# Patient Record
Sex: FEMALE | Race: BLACK OR AFRICAN AMERICAN | NOT HISPANIC OR LATINO | Employment: FULL TIME | URBAN - METROPOLITAN AREA
[De-identification: names, ages, dates, MRNs, and addresses within clinical notes are randomized per-mention and may not be internally consistent; named-entity substitution may affect disease eponyms.]

---

## 2022-02-28 ENCOUNTER — HISTORICAL (OUTPATIENT)
Dept: LAB | Facility: HOSPITAL | Age: 32
End: 2022-02-28

## 2022-02-28 LAB
ABS NEUT (OLG): 3.1 (ref 1.5–6.9)
ALBUMIN SERPL-MCNC: 3.6 G/DL (ref 3.5–5)
ALBUMIN/GLOB SERPL: 1 {RATIO} (ref 1.1–2)
ALP SERPL-CCNC: 102 U/L (ref 40–150)
ALT SERPL-CCNC: 11 U/L (ref 0–55)
AST SERPL-CCNC: 14 U/L (ref 5–34)
BASOPHILS # BLD AUTO: 0.1 10*3/UL (ref 0–0.1)
BASOPHILS NFR BLD AUTO: 1 % (ref 0–1)
BILIRUB SERPL-MCNC: 0.5 MG/DL
BILIRUBIN DIRECT+TOT PNL SERPL-MCNC: 0.2 (ref 0–0.5)
BILIRUBIN DIRECT+TOT PNL SERPL-MCNC: 0.3 (ref 0–0.8)
BUN SERPL-MCNC: 13 MG/DL (ref 7–18.7)
CALCIUM SERPL-MCNC: 9.1 MG/DL (ref 8.7–10.5)
CHLORIDE SERPL-SCNC: 107 MMOL/L (ref 98–107)
CHOLEST SERPL-MCNC: 177 MG/DL
CHOLEST/HDLC SERPL: 4 {RATIO} (ref 0–5)
CO2 SERPL-SCNC: 28 MMOL/L (ref 22–29)
CREAT SERPL-MCNC: 0.75 MG/DL (ref 0.55–1.02)
DEPRECATED CALCIDIOL+CALCIFEROL SERPL-MC: 13.7 NG/ML (ref 30–80)
EOSINOPHIL # BLD AUTO: 0.2 10*3/UL (ref 0–0.6)
EOSINOPHIL NFR BLD AUTO: 3 % (ref 0–5)
ERYTHROCYTE [DISTWIDTH] IN BLOOD BY AUTOMATED COUNT: 14.7 % (ref 11.5–17)
ESTRADIOL SERPL HS-MCNC: 40 PG/ML
FSH SERPL-ACNC: 8.5 M[IU]/ML
GLOBULIN SER-MCNC: 3.7 G/DL (ref 2.4–3.5)
GLUCOSE SERPL-MCNC: 110 MG/DL (ref 74–100)
HCT VFR BLD AUTO: 37.8 % (ref 36–48)
HDLC SERPL-MCNC: 42 MG/DL (ref 35–60)
HEMOLYSIS INTERF INDEX SERPL-ACNC: 1
HGB BLD-MCNC: 11.7 G/DL (ref 12–16)
ICTERIC INTERF INDEX SERPL-ACNC: 0
IMM GRANULOCYTES # BLD AUTO: 0.01 10*3/UL (ref 0–0.02)
IMM GRANULOCYTES NFR BLD AUTO: 0.2 % (ref 0–0.43)
LDLC SERPL CALC-MCNC: 112 MG/DL (ref 50–140)
LH SERPL-ACNC: 3.97 M[IU]/ML
LIPEMIC INTERF INDEX SERPL-ACNC: 0
LYMPHOCYTES # BLD AUTO: 2 10*3/UL (ref 0.5–4.1)
LYMPHOCYTES NFR BLD AUTO: 34 % (ref 15–40)
MANUAL DIFF? (OHS): NO
MCH RBC QN AUTO: 27 PG (ref 27–34)
MCHC RBC AUTO-ENTMCNC: 31 G/DL (ref 31–36)
MCV RBC AUTO: 86 FL (ref 80–99)
MONOCYTES # BLD AUTO: 0.4 10*3/UL (ref 0–1.1)
MONOCYTES NFR BLD AUTO: 8 % (ref 4–12)
NEUTROPHILS # BLD AUTO: 3.1 10*3/UL (ref 1.5–6.9)
NEUTROPHILS NFR BLD AUTO: 54 % (ref 43–75)
PLATELET # BLD AUTO: 297 10*3/UL (ref 140–400)
PMV BLD AUTO: 12.1 FL (ref 6.8–10)
POTASSIUM SERPL-SCNC: 3.8 MMOL/L (ref 3.5–5.1)
PROLACTIN LEVEL (OHS): 7.63 (ref 5.18–26.53)
PROT SERPL-MCNC: 7.3 G/DL (ref 6.4–8.3)
RBC # BLD AUTO: 4.37 10*6/UL (ref 4.2–5.4)
SODIUM SERPL-SCNC: 140 MMOL/L (ref 136–145)
TRIGL SERPL-MCNC: 114 MG/DL (ref 37–140)
TSH SERPL-ACNC: 1.59 M[IU]/L (ref 0.35–4.94)
VLDLC SERPL CALC-MCNC: 23 MG/DL
WBC # SPEC AUTO: 5.8 10*3/UL (ref 4.5–11.5)

## 2022-09-12 DIAGNOSIS — D50.8 IRON DEFICIENCY ANEMIA SECONDARY TO INADEQUATE DIETARY IRON INTAKE: Primary | ICD-10-CM

## 2022-09-26 ENCOUNTER — HOSPITAL ENCOUNTER (OUTPATIENT)
Dept: RADIOLOGY | Facility: HOSPITAL | Age: 32
Discharge: HOME | End: 2022-09-26
Attending: FAMILY MEDICINE
Payer: COMMERCIAL

## 2022-09-26 DIAGNOSIS — D50.8 IRON DEFICIENCY ANEMIA SECONDARY TO INADEQUATE DIETARY IRON INTAKE: ICD-10-CM

## 2022-09-26 PROCEDURE — 76856 US EXAM PELVIC COMPLETE: CPT | Mod: TC

## 2022-10-07 DIAGNOSIS — D50.0 ANEMIA DUE TO CHRONIC BLOOD LOSS: Primary | ICD-10-CM

## 2022-10-11 ENCOUNTER — OFFICE VISIT (OUTPATIENT)
Dept: HEMATOLOGY/ONCOLOGY | Facility: CLINIC | Age: 32
End: 2022-10-11
Payer: COMMERCIAL

## 2022-10-11 VITALS
SYSTOLIC BLOOD PRESSURE: 142 MMHG | HEIGHT: 72 IN | BODY MASS INDEX: 39.68 KG/M2 | RESPIRATION RATE: 18 BRPM | TEMPERATURE: 98 F | DIASTOLIC BLOOD PRESSURE: 83 MMHG | OXYGEN SATURATION: 100 % | HEART RATE: 94 BPM | WEIGHT: 293 LBS

## 2022-10-11 DIAGNOSIS — D50.9 MICROCYTIC ANEMIA: Primary | ICD-10-CM

## 2022-10-11 DIAGNOSIS — D50.0 ANEMIA DUE TO CHRONIC BLOOD LOSS: ICD-10-CM

## 2022-10-11 PROCEDURE — 1159F MED LIST DOCD IN RCRD: CPT | Mod: CPTII,S$GLB,, | Performed by: INTERNAL MEDICINE

## 2022-10-11 PROCEDURE — 3077F SYST BP >= 140 MM HG: CPT | Mod: CPTII,S$GLB,, | Performed by: INTERNAL MEDICINE

## 2022-10-11 PROCEDURE — 99204 OFFICE O/P NEW MOD 45 MIN: CPT | Mod: S$GLB,,, | Performed by: INTERNAL MEDICINE

## 2022-10-11 PROCEDURE — 1159F PR MEDICATION LIST DOCUMENTED IN MEDICAL RECORD: ICD-10-PCS | Mod: CPTII,S$GLB,, | Performed by: INTERNAL MEDICINE

## 2022-10-11 PROCEDURE — 99999 PR PBB SHADOW E&M-EST. PATIENT-LVL V: ICD-10-PCS | Mod: PBBFAC,,, | Performed by: INTERNAL MEDICINE

## 2022-10-11 PROCEDURE — 3079F PR MOST RECENT DIASTOLIC BLOOD PRESSURE 80-89 MM HG: ICD-10-PCS | Mod: CPTII,S$GLB,, | Performed by: INTERNAL MEDICINE

## 2022-10-11 PROCEDURE — 3008F BODY MASS INDEX DOCD: CPT | Mod: CPTII,S$GLB,, | Performed by: INTERNAL MEDICINE

## 2022-10-11 PROCEDURE — 3077F PR MOST RECENT SYSTOLIC BLOOD PRESSURE >= 140 MM HG: ICD-10-PCS | Mod: CPTII,S$GLB,, | Performed by: INTERNAL MEDICINE

## 2022-10-11 PROCEDURE — 1160F RVW MEDS BY RX/DR IN RCRD: CPT | Mod: CPTII,S$GLB,, | Performed by: INTERNAL MEDICINE

## 2022-10-11 PROCEDURE — 3079F DIAST BP 80-89 MM HG: CPT | Mod: CPTII,S$GLB,, | Performed by: INTERNAL MEDICINE

## 2022-10-11 PROCEDURE — 99999 PR PBB SHADOW E&M-EST. PATIENT-LVL V: CPT | Mod: PBBFAC,,, | Performed by: INTERNAL MEDICINE

## 2022-10-11 PROCEDURE — 99204 PR OFFICE/OUTPT VISIT, NEW, LEVL IV, 45-59 MIN: ICD-10-PCS | Mod: S$GLB,,, | Performed by: INTERNAL MEDICINE

## 2022-10-11 PROCEDURE — 3008F PR BODY MASS INDEX (BMI) DOCUMENTED: ICD-10-PCS | Mod: CPTII,S$GLB,, | Performed by: INTERNAL MEDICINE

## 2022-10-11 PROCEDURE — 1160F PR REVIEW ALL MEDS BY PRESCRIBER/CLIN PHARMACIST DOCUMENTED: ICD-10-PCS | Mod: CPTII,S$GLB,, | Performed by: INTERNAL MEDICINE

## 2022-10-11 RX ORDER — FERROUS SULFATE 325(65) MG
1 TABLET, DELAYED RELEASE (ENTERIC COATED) ORAL 3 TIMES DAILY
COMMUNITY
Start: 2022-09-09 | End: 2022-11-01

## 2022-10-11 RX ORDER — DICYCLOMINE HYDROCHLORIDE 20 MG/1
1 TABLET ORAL 2 TIMES DAILY
Status: ON HOLD | COMMUNITY
Start: 2022-09-28 | End: 2023-01-24

## 2022-10-11 NOTE — PROGRESS NOTES
[facility] Hematology Consult Note    Referring provider: Koko Montana MD    Subjective:       Patient ID: Orin Carcamo is a 32 y.o. female.    .refe  Chief Complaint: Anemia (NHP- Anemia)    Patient is a 32 y.o. year old female with medical history as below. Patient presents for anemia today.   She has had 2 kids in past. Cycles became heavier since last child.   Dr. Sutherland referred her, now on depo shot since 9/9 since she was passing huge clots.   Today she says she's back on cycle again for almost a week, will follow up with gynecologist for possibly another depo shot.   Now eating 2 cups of ice a day.     Labs from September shows Hgb 6.9 MCV 70. She has never had a transfusion or iron infusion. Denies family history of anemia.     Past Medical History:   Diagnosis Date    Anemia, unspecified     HTN (hypertension)       Review of patient's allergies indicates:  No Known Allergies   Current Outpatient Medications on File Prior to Visit   Medication Sig Dispense Refill    dicyclomine (BENTYL) 20 mg tablet Take 1 tablet by mouth 2 (two) times daily.      ferrous sulfate 325 (65 FE) MG EC tablet Take 1 tablet by mouth 3 (three) times daily.       No current facility-administered medications on file prior to visit.      Social History     Socioeconomic History    Marital status: Single   Tobacco Use    Smoking status: Never    Smokeless tobacco: Never   Substance and Sexual Activity    Alcohol use: Yes    Drug use: Never    Sexual activity: Never     Family History   Problem Relation Age of Onset    Diabetes Mother                 Review of Systems  All 12 review of systems negative except as mentioned in HPI.     Physical Exam  Gen: Well appearing, appears stated age, in no acute distress  CV: RRR, no murmurs / thrills auscultated  Pulm: CTA bilaterally, normal respiratory effort  GI: Nondistended, nontender to palpation  Skin: No rashes noted, warm to touch  Extremities: No edema noted.   MSK: Normal ROM.    Psych: Normal affect, normal speech and thought content  Neuro: AAO x 4, no focal deficits.       No visits with results within 2 Week(s) from this visit.   Latest known visit with results is:   Historical on 02/28/2022   Component Date Value    WBC 02/28/2022 5.8     RBC 02/28/2022 4.37     Hgb 02/28/2022 11.7     Hct 02/28/2022 37.8     MCV 02/28/2022 86     MCH 02/28/2022 27     MCHC 02/28/2022 31     RDW 02/28/2022 14.7     Platelet 02/28/2022 297     MPV 02/28/2022 12.1     Abs Neut 02/28/2022 3.1     Manual Diff? 02/28/2022 No     Neut % 02/28/2022 54     Lymph % 02/28/2022 34     Mono % 02/28/2022 8     Eos % 02/28/2022 3     Basophil % 02/28/2022 1     Lymph # 02/28/2022 2.0     Neut # 02/28/2022 3.1     Mono # 02/28/2022 0.4     Eos # 02/28/2022 0.2     Baso # 02/28/2022 0.1     IG# 02/28/2022 0.0100     IG% 02/28/2022 0.200     Sodium Level 02/28/2022 140     Potassium Level 02/28/2022 3.8     Chloride 02/28/2022 107     Carbon Dioxide 02/28/2022 28     Glucose Level 02/28/2022 110     Blood Urea Nitrogen 02/28/2022 13.0     Creatinine 02/28/2022 0.75     Calcium Level Total 02/28/2022 9.1     Albumin Level 02/28/2022 3.6     Protein Total 02/28/2022 7.3     Globulin 02/28/2022 3.7     Albumin/Globulin Ratio 02/28/2022 1.0     Alkaline Phosphatase 02/28/2022 102     Bilirubin Total 02/28/2022 0.5     Bilirubin Direct 02/28/2022 0.2     Bilirubin Indirect 02/28/2022 0.30     Aspartate Aminotransfera* 02/28/2022 14     Alanine Aminotransferase 02/28/2022 11     Hemolysis 02/28/2022 1     Icterus 02/28/2022 0     Lipemia 02/28/2022 0     Cholesterol Total 02/28/2022 177     HDL Cholesterol 02/28/2022 42     Triglyceride 02/28/2022 114     Very Low Density Lipopro* 02/28/2022 23     Cholesterol/HDL Ratio 02/28/2022 4     LDL Cholesterol 02/28/2022 112.00     Estimated GFR- Am* 02/28/2022 >60     Estimated GFR-Non Blessing* 02/28/2022 >60     Thyroid Stimulating Horm* 02/28/2022 1.5863     Vit D 25 OH  02/28/2022 13.7     Follicle Stimulating Hor* 02/28/2022 8.50     Luteinizing Hormone 02/28/2022 3.97     Prolactin Level 02/28/2022 7.63     Estradiol Level 02/28/2022 40         US Pelvis Complete Non OB  Narrative: EXAMINATION:  ULTRASOUND PELVIS NON OB COMPLETE:    CLINICAL HISTORY:  Other iron deficiency anemias,    COMPARISON:  09/14/2016    FINDINGS:  Transabdominal  scanning was performed. Multiple sonographic images reveal the uterus to be prominent size measuring 4.3 x 5.4 x 6.3 cm..  The right ovary measures 2.8 x 1.5 x 3.0 cm and demonstrates venous flow.  The left ovary measures 3.2 x 2.1 x 2.1 cm and demonstrates venous flow.   The endometrium thickened measuring 2.6 cm.  No free fluid is seen within the cul-de-sac. No abnormal adnexal mass is seen.  Impression: 1. Prominent uterus with thickened endometrium measuring 2.6 cm.  Clinical correlation is indicated    Electronically signed by: Fermin Moncada  Date:    09/26/2022  Time:    14:07          Assessment:       1. Microcytic anemia    2. Anemia due to chronic blood loss         Plan:       - Most likely diagnosis is iron deficiency anemia not responding to PO iron  - Will confirm on labs today with CBC, CMP, iron panel, ferritin  - Review labs in 1 week and schedule iron infusion at the time                        I personally reviewed all pertinent imaging, lab results, explained to the patient the pertinent information in detail including radiographic imaging, abnormal lab results. All questions were answered thoroughly. Total time spent on this visit was >65 minutes.    Jp Vasquez M.D.   Hematology / Oncology

## 2022-10-13 LAB
HUMAN PAPILLOMAVIRUS (HPV): NORMAL
PAP RECOMMENDATION EXT: NORMAL
PAP SMEAR: NORMAL

## 2022-10-18 ENCOUNTER — HISTORICAL (OUTPATIENT)
Dept: ADMINISTRATIVE | Facility: HOSPITAL | Age: 32
End: 2022-10-18

## 2022-10-18 ENCOUNTER — OFFICE VISIT (OUTPATIENT)
Dept: HEMATOLOGY/ONCOLOGY | Facility: CLINIC | Age: 32
End: 2022-10-18
Payer: COMMERCIAL

## 2022-10-18 VITALS
OXYGEN SATURATION: 100 % | WEIGHT: 293 LBS | SYSTOLIC BLOOD PRESSURE: 135 MMHG | TEMPERATURE: 98 F | HEART RATE: 75 BPM | BODY MASS INDEX: 39.68 KG/M2 | DIASTOLIC BLOOD PRESSURE: 90 MMHG | RESPIRATION RATE: 18 BRPM | HEIGHT: 72 IN

## 2022-10-18 DIAGNOSIS — D50.0 IRON DEFICIENCY ANEMIA DUE TO CHRONIC BLOOD LOSS: ICD-10-CM

## 2022-10-18 DIAGNOSIS — D50.0 ANEMIA DUE TO CHRONIC BLOOD LOSS: Primary | ICD-10-CM

## 2022-10-18 PROCEDURE — 99214 OFFICE O/P EST MOD 30 MIN: CPT | Mod: PBBFAC | Performed by: NURSE PRACTITIONER

## 2022-10-18 PROCEDURE — 3075F PR MOST RECENT SYSTOLIC BLOOD PRESS GE 130-139MM HG: ICD-10-PCS | Mod: CPTII,S$GLB,, | Performed by: NURSE PRACTITIONER

## 2022-10-18 PROCEDURE — 1159F MED LIST DOCD IN RCRD: CPT | Mod: CPTII,S$GLB,, | Performed by: NURSE PRACTITIONER

## 2022-10-18 PROCEDURE — 99214 PR OFFICE/OUTPT VISIT, EST, LEVL IV, 30-39 MIN: ICD-10-PCS | Mod: S$GLB,,, | Performed by: NURSE PRACTITIONER

## 2022-10-18 PROCEDURE — 3075F SYST BP GE 130 - 139MM HG: CPT | Mod: CPTII,S$GLB,, | Performed by: NURSE PRACTITIONER

## 2022-10-18 PROCEDURE — 1160F PR REVIEW ALL MEDS BY PRESCRIBER/CLIN PHARMACIST DOCUMENTED: ICD-10-PCS | Mod: CPTII,S$GLB,, | Performed by: NURSE PRACTITIONER

## 2022-10-18 PROCEDURE — 99999 PR PBB SHADOW E&M-EST. PATIENT-LVL IV: CPT | Mod: PBBFAC,,, | Performed by: NURSE PRACTITIONER

## 2022-10-18 PROCEDURE — 99999 PR PBB SHADOW E&M-EST. PATIENT-LVL IV: ICD-10-PCS | Mod: PBBFAC,,, | Performed by: NURSE PRACTITIONER

## 2022-10-18 PROCEDURE — 1160F RVW MEDS BY RX/DR IN RCRD: CPT | Mod: CPTII,S$GLB,, | Performed by: NURSE PRACTITIONER

## 2022-10-18 PROCEDURE — 3080F DIAST BP >= 90 MM HG: CPT | Mod: CPTII,S$GLB,, | Performed by: NURSE PRACTITIONER

## 2022-10-18 PROCEDURE — 3080F PR MOST RECENT DIASTOLIC BLOOD PRESSURE >= 90 MM HG: ICD-10-PCS | Mod: CPTII,S$GLB,, | Performed by: NURSE PRACTITIONER

## 2022-10-18 PROCEDURE — 99214 OFFICE O/P EST MOD 30 MIN: CPT | Mod: S$GLB,,, | Performed by: NURSE PRACTITIONER

## 2022-10-18 PROCEDURE — 1159F PR MEDICATION LIST DOCUMENTED IN MEDICAL RECORD: ICD-10-PCS | Mod: CPTII,S$GLB,, | Performed by: NURSE PRACTITIONER

## 2022-10-18 RX ORDER — SODIUM CHLORIDE 0.9 % (FLUSH) 0.9 %
10 SYRINGE (ML) INJECTION
Status: DISCONTINUED | OUTPATIENT
Start: 2022-10-18 | End: 2023-01-24 | Stop reason: HOSPADM

## 2022-10-18 RX ORDER — HEPARIN 100 UNIT/ML
500 SYRINGE INTRAVENOUS
Status: CANCELLED | OUTPATIENT
Start: 2022-10-25

## 2022-10-18 RX ORDER — DIPHENHYDRAMINE HYDROCHLORIDE 50 MG/ML
25 INJECTION INTRAMUSCULAR; INTRAVENOUS
Status: CANCELLED | OUTPATIENT
Start: 2022-10-18

## 2022-10-18 RX ORDER — SODIUM CHLORIDE 0.9 % (FLUSH) 0.9 %
10 SYRINGE (ML) INJECTION
Status: CANCELLED | OUTPATIENT
Start: 2022-10-25

## 2022-10-18 RX ORDER — ACETAMINOPHEN 325 MG/1
650 TABLET ORAL
Status: CANCELLED | OUTPATIENT
Start: 2022-10-18

## 2022-10-18 RX ORDER — HYDROCODONE BITARTRATE AND ACETAMINOPHEN 500; 5 MG/1; MG/1
TABLET ORAL ONCE
Status: CANCELLED | OUTPATIENT
Start: 2022-10-18 | End: 2022-10-18

## 2022-10-18 NOTE — PROGRESS NOTES
[facility] Hematology Consult Note    Referring provider: No ref. provider found    Subjective:       Patient ID: Orin Carcamo is a 32 y.o. female.    .refe  Chief Complaint: MICROCYTIC ANEMIA (Pt reports stomach pain with taking iron pills. Pt states pain 4/10. Pt reports moderate fatigue.)    Ms. Carcamo is a 32 y.o. year old AA female who was referred to our service by Dr. Sutherland  for anemia.     She has had 2 kids in past and her cycles became heavier since last child. She admits prior to September her menstraul cycle continued for 2 months straight. The bleeding was heavy with large clots and she would use 1 pack of pads per day. She was given a Depo shot 9/9/22 which stopped the bleeding, but she had another cycle the week of 10/10/22 which was light per the patient. She will be seeing a GYN (Dr. Byrd) today for blood work, and US and to discuss treatment options. She has been taking oral iron 1 pill daily which has caused her stomach upset and chills. She admits to shortness of breath, cold intolerance, and severe fatigue. She denies chest pain or dizziness at this time. She eats 2 cups of ice a day.     Labs from September shows Hgb 6.9 MCV 70. She has never had a transfusion or iron infusion. Denies family history of anemia.     Past Medical History:   Diagnosis Date    Anemia, unspecified     HTN (hypertension)     Iron deficiency anemia due to chronic blood loss 10/18/2022      Review of patient's allergies indicates:  No Known Allergies   Current Outpatient Medications on File Prior to Visit   Medication Sig Dispense Refill    dicyclomine (BENTYL) 20 mg tablet Take 1 tablet by mouth 2 (two) times daily.      ferrous sulfate 325 (65 FE) MG EC tablet Take 1 tablet by mouth 3 (three) times daily.       No current facility-administered medications on file prior to visit.      Social History     Socioeconomic History    Marital status: Single   Tobacco Use    Smoking status: Never    Smokeless tobacco: Never    Substance and Sexual Activity    Alcohol use: Yes    Drug use: Never    Sexual activity: Never     Family History   Problem Relation Age of Onset    Diabetes Mother                 Review of Systems  All 12 review of systems negative except as mentioned in HPI.     Physical Exam  Gen: Well appearing, appears stated age, in no acute distress  CV: RRR, no murmurs / thrills auscultated  Pulm: CTA bilaterally, normal respiratory effort  GI: Nondistended, nontender to palpation  Skin: No rashes noted, warm to touch  Extremities: No edema noted.   MSK: Normal ROM.   Psych: Normal affect, normal speech and thought content  Neuro: AAO x 4, no focal deficits.       Lab Visit on 10/11/2022   Component Date Value    Iron Binding Capacity Un* 10/11/2022 242     Iron Level 10/11/2022 262 (H)     Iron Binding Capacity To* 10/11/2022 504 (H)     Iron Saturation 10/11/2022 52 (H)     Ferritin Level 10/11/2022 6.96     Sodium Level 10/11/2022 139     Potassium Level 10/11/2022 4.5     Chloride 10/11/2022 107     Carbon Dioxide 10/11/2022 24     Glucose Level 10/11/2022 93     Blood Urea Nitrogen 10/11/2022 10.0     Creatinine 10/11/2022 0.95     Calcium Level Total 10/11/2022 9.0     Protein Total 10/11/2022 7.2     Albumin Level 10/11/2022 3.6     Globulin 10/11/2022 3.6 (H)     Albumin/Globulin Ratio 10/11/2022 1.0 (L)     Bilirubin Total 10/11/2022 0.5     Alkaline Phosphatase 10/11/2022 78     Alanine Aminotransferase 10/11/2022 11     Aspartate Aminotransfera* 10/11/2022 12     eGFR 10/11/2022 >60     WBC 10/11/2022 8.1     RBC 10/11/2022 3.19 (L)     Hgb 10/11/2022 6.0 (L)     Hct 10/11/2022 22.9 (L)     MCV 10/11/2022 71.8 (L)     MCH 10/11/2022 18.8 (L)     MCHC 10/11/2022 26.2 (L)     RDW 10/11/2022 20.4 (H)     Platelet 10/11/2022 454 (H)     MPV 10/11/2022 11.3 (H)     Neut % 10/11/2022 54.3     Lymph % 10/11/2022 34.4     Mono % 10/11/2022 7.7     Eos % 10/11/2022 3.0     Basophil % 10/11/2022 0.2     Lymph #  10/11/2022 2.79     Neut # 10/11/2022 4.4     Mono # 10/11/2022 0.62     Eos # 10/11/2022 0.24     Baso # 10/11/2022 0.02     IG# 10/11/2022 0.03     IG% 10/11/2022 0.4     RBC Morph 10/11/2022 Abnormal (A)     Anisocyte 10/11/2022 Slight (A)     Hypochrom 10/11/2022 2+ (A)     Microcyte 10/11/2022 Slight (A)     Poik 10/11/2022 Slight (A)     Platelet Est 10/11/2022 Increased (A)         US Pelvis Complete Non OB  Narrative: EXAMINATION:  ULTRASOUND PELVIS NON OB COMPLETE:    CLINICAL HISTORY:  Other iron deficiency anemias,    COMPARISON:  09/14/2016    FINDINGS:  Transabdominal  scanning was performed. Multiple sonographic images reveal the uterus to be prominent size measuring 4.3 x 5.4 x 6.3 cm..  The right ovary measures 2.8 x 1.5 x 3.0 cm and demonstrates venous flow.  The left ovary measures 3.2 x 2.1 x 2.1 cm and demonstrates venous flow.   The endometrium thickened measuring 2.6 cm.  No free fluid is seen within the cul-de-sac. No abnormal adnexal mass is seen.  Impression: 1. Prominent uterus with thickened endometrium measuring 2.6 cm.  Clinical correlation is indicated    Electronically signed by: Fermin Moncada  Date:    09/26/2022  Time:    14:07          Assessment:       1. Iron deficiency anemia due to chronic blood loss     Today her Hgb is noted at 6.0, her Ferritin is 6.96, most likely related to menorrhagia. Due to her shortness of breath and weakness we will plan to administer 2u PRBC this week. Additionally we arrange for her to receive Venofer x 5 doses. We will discontinue PO iron due to her poor tolerance. She will further discuss with GYN regarding her menorrhagia and treatment options.     Plan:       RTC in 2 weeks with NP and CBC   Start Venofer x 5       Therapy Plan Information  Medications  iron sucrose (VENOFER) 200 mg in sodium chloride 0.9% 100 mL IVPB  200 mg, Intravenous, 2 times a week, at least 2 days apart  Flushes  sodium chloride 0.9% 250 mL flush bag  Intravenous, 1 time a  week  sodium chloride 0.9% flush 10 mL  10 mL, Intravenous, 1 time a week  heparin, porcine (PF) 100 unit/mL injection flush 500 Units  500 Units, Intravenous, 1 time a week  alteplase injection 2 mg  2 mg, Intra-Catheter, 1 time a week                   Answers submitted by the patient for this visit:  Review of Systems Questionnaire (Submitted on 10/18/2022)  appetite change : No  unexpected weight change: No  mouth sores: No  visual disturbance: No  cough: No  shortness of breath: No  chest pain: No  abdominal pain: No  diarrhea: No  frequency: No  back pain: No  rash: No  headaches: No  adenopathy: No  nervous/ anxious: No

## 2022-10-20 ENCOUNTER — INFUSION (OUTPATIENT)
Dept: INFUSION THERAPY | Facility: HOSPITAL | Age: 32
End: 2022-10-20
Payer: COMMERCIAL

## 2022-10-20 VITALS
TEMPERATURE: 98 F | HEART RATE: 70 BPM | SYSTOLIC BLOOD PRESSURE: 142 MMHG | RESPIRATION RATE: 18 BRPM | OXYGEN SATURATION: 100 % | DIASTOLIC BLOOD PRESSURE: 82 MMHG

## 2022-10-20 DIAGNOSIS — D50.0 ANEMIA DUE TO CHRONIC BLOOD LOSS: ICD-10-CM

## 2022-10-20 LAB
ABO + RH BLD: NORMAL
ABO + RH BLD: NORMAL
BLD PROD TYP BPU: NORMAL
BLD PROD TYP BPU: NORMAL
BLOOD UNIT EXPIRATION DATE: NORMAL
BLOOD UNIT EXPIRATION DATE: NORMAL
BLOOD UNIT TYPE CODE: 5100
BLOOD UNIT TYPE CODE: 5100
CROSSMATCH INTERPRETATION: NORMAL
CROSSMATCH INTERPRETATION: NORMAL
DISPENSE STATUS: NORMAL
DISPENSE STATUS: NORMAL
GROUP & RH: NORMAL
INDIRECT COOMBS GEL: NORMAL
UNIT NUMBER: NORMAL
UNIT NUMBER: NORMAL

## 2022-10-20 PROCEDURE — 86850 RBC ANTIBODY SCREEN: CPT | Performed by: NURSE PRACTITIONER

## 2022-10-20 PROCEDURE — 25000003 PHARM REV CODE 250: Performed by: NURSE PRACTITIONER

## 2022-10-20 PROCEDURE — 36415 COLL VENOUS BLD VENIPUNCTURE: CPT

## 2022-10-20 PROCEDURE — 86920 COMPATIBILITY TEST SPIN: CPT | Performed by: NURSE PRACTITIONER

## 2022-10-20 PROCEDURE — P9016 RBC LEUKOCYTES REDUCED: HCPCS | Performed by: NURSE PRACTITIONER

## 2022-10-20 PROCEDURE — 63600175 PHARM REV CODE 636 W HCPCS: Performed by: NURSE PRACTITIONER

## 2022-10-20 PROCEDURE — 36430 TRANSFUSION BLD/BLD COMPNT: CPT

## 2022-10-20 RX ORDER — HYDROCODONE BITARTRATE AND ACETAMINOPHEN 500; 5 MG/1; MG/1
TABLET ORAL ONCE
Status: COMPLETED | OUTPATIENT
Start: 2022-10-20 | End: 2022-10-20

## 2022-10-20 RX ORDER — DIPHENHYDRAMINE HYDROCHLORIDE 50 MG/ML
25 INJECTION INTRAMUSCULAR; INTRAVENOUS
Status: COMPLETED | OUTPATIENT
Start: 2022-10-20 | End: 2022-10-20

## 2022-10-20 RX ORDER — ACETAMINOPHEN 325 MG/1
650 TABLET ORAL
Status: COMPLETED | OUTPATIENT
Start: 2022-10-20 | End: 2022-10-20

## 2022-10-20 RX ADMIN — DIPHENHYDRAMINE HYDROCHLORIDE 25 MG: 50 INJECTION, SOLUTION INTRAMUSCULAR; INTRAVENOUS at 09:10

## 2022-10-20 RX ADMIN — SODIUM CHLORIDE: 9 INJECTION, SOLUTION INTRAVENOUS at 09:10

## 2022-10-20 RX ADMIN — ACETAMINOPHEN 650 MG: 325 TABLET ORAL at 08:10

## 2022-10-20 NOTE — PLAN OF CARE
Pt will have no s/s of blood transfusion reaction, prbcs verified by 2 Rns, tylenol, benadryl given as pre med regimen, pt monitored initial 15 minutes after beginning transfusion

## 2022-10-26 ENCOUNTER — INFUSION (OUTPATIENT)
Dept: INFUSION THERAPY | Facility: HOSPITAL | Age: 32
End: 2022-10-26
Attending: NURSE PRACTITIONER
Payer: COMMERCIAL

## 2022-10-26 VITALS
DIASTOLIC BLOOD PRESSURE: 73 MMHG | HEART RATE: 71 BPM | WEIGHT: 293 LBS | SYSTOLIC BLOOD PRESSURE: 124 MMHG | RESPIRATION RATE: 18 BRPM | HEIGHT: 72 IN | OXYGEN SATURATION: 98 % | BODY MASS INDEX: 39.68 KG/M2 | TEMPERATURE: 98 F

## 2022-10-26 DIAGNOSIS — D50.0 IRON DEFICIENCY ANEMIA DUE TO CHRONIC BLOOD LOSS: Primary | ICD-10-CM

## 2022-10-26 PROCEDURE — 96366 THER/PROPH/DIAG IV INF ADDON: CPT

## 2022-10-26 PROCEDURE — 96365 THER/PROPH/DIAG IV INF INIT: CPT

## 2022-10-26 PROCEDURE — 63600175 PHARM REV CODE 636 W HCPCS

## 2022-10-26 PROCEDURE — 63600175 PHARM REV CODE 636 W HCPCS: Performed by: NURSE PRACTITIONER

## 2022-10-26 PROCEDURE — 25000003 PHARM REV CODE 250

## 2022-10-26 RX ORDER — HEPARIN 100 UNIT/ML
500 SYRINGE INTRAVENOUS
Status: CANCELLED | OUTPATIENT
Start: 2022-10-28

## 2022-10-26 RX ORDER — SODIUM CHLORIDE 0.9 % (FLUSH) 0.9 %
10 SYRINGE (ML) INJECTION
Status: CANCELLED | OUTPATIENT
Start: 2022-10-28

## 2022-10-26 RX ADMIN — Medication 200 MG: at 09:10

## 2022-10-28 ENCOUNTER — INFUSION (OUTPATIENT)
Dept: INFUSION THERAPY | Facility: HOSPITAL | Age: 32
End: 2022-10-28
Payer: COMMERCIAL

## 2022-10-28 VITALS
HEART RATE: 77 BPM | TEMPERATURE: 98 F | BODY MASS INDEX: 39.68 KG/M2 | SYSTOLIC BLOOD PRESSURE: 153 MMHG | OXYGEN SATURATION: 98 % | WEIGHT: 293 LBS | RESPIRATION RATE: 18 BRPM | HEIGHT: 72 IN | DIASTOLIC BLOOD PRESSURE: 98 MMHG

## 2022-10-28 DIAGNOSIS — D50.0 IRON DEFICIENCY ANEMIA DUE TO CHRONIC BLOOD LOSS: Primary | ICD-10-CM

## 2022-10-28 PROCEDURE — 25000003 PHARM REV CODE 250: Performed by: NURSE PRACTITIONER

## 2022-10-28 PROCEDURE — 96365 THER/PROPH/DIAG IV INF INIT: CPT

## 2022-10-28 PROCEDURE — 96366 THER/PROPH/DIAG IV INF ADDON: CPT

## 2022-10-28 PROCEDURE — 63600175 PHARM REV CODE 636 W HCPCS: Performed by: NURSE PRACTITIONER

## 2022-10-28 RX ORDER — SODIUM CHLORIDE 0.9 % (FLUSH) 0.9 %
10 SYRINGE (ML) INJECTION
Status: CANCELLED | OUTPATIENT
Start: 2022-11-02

## 2022-10-28 RX ORDER — HEPARIN 100 UNIT/ML
500 SYRINGE INTRAVENOUS
Status: CANCELLED | OUTPATIENT
Start: 2022-11-02

## 2022-10-28 RX ADMIN — IRON SUCROSE 200 MG: 20 INJECTION, SOLUTION INTRAVENOUS at 08:10

## 2022-10-28 NOTE — PLAN OF CARE
Encourage use of stress-management techniques (e.g., coping strategies, psychosocial support, distraction, relaxation, massage).  Facilitate sleep/rest patterns, such as maintaining a bedtime routine; avoid long naps; adjust sleep environment to manage impact of fatigue.  Reassess effectiveness of interventions at regular intervals.

## 2022-10-31 ENCOUNTER — LAB VISIT (OUTPATIENT)
Dept: LAB | Facility: HOSPITAL | Age: 32
End: 2022-10-31
Attending: NURSE PRACTITIONER
Payer: COMMERCIAL

## 2022-10-31 DIAGNOSIS — D50.0 IRON DEFICIENCY ANEMIA DUE TO CHRONIC BLOOD LOSS: ICD-10-CM

## 2022-10-31 LAB
BASOPHILS # BLD AUTO: 0.09 X10(3)/MCL (ref 0–0.2)
BASOPHILS NFR BLD AUTO: 1 %
EOSINOPHIL # BLD AUTO: 0.24 X10(3)/MCL (ref 0–0.9)
EOSINOPHIL NFR BLD AUTO: 2.7 %
ERYTHROCYTE [DISTWIDTH] IN BLOOD BY AUTOMATED COUNT: 23.7 % (ref 11.5–17)
HCT VFR BLD AUTO: 31.6 % (ref 37–47)
HGB BLD-MCNC: 8.6 GM/DL (ref 12–16)
IMM GRANULOCYTES # BLD AUTO: 0.03 X10(3)/MCL (ref 0–0.04)
IMM GRANULOCYTES NFR BLD AUTO: 0.3 %
LYMPHOCYTES # BLD AUTO: 2.77 X10(3)/MCL (ref 0.6–4.6)
LYMPHOCYTES NFR BLD AUTO: 30.7 %
MCH RBC QN AUTO: 20.9 PG (ref 27–31)
MCHC RBC AUTO-ENTMCNC: 27.2 MG/DL (ref 33–36)
MCV RBC AUTO: 76.7 FL (ref 80–94)
MONOCYTES # BLD AUTO: 0.64 X10(3)/MCL (ref 0.1–1.3)
MONOCYTES NFR BLD AUTO: 7.1 %
NEUTROPHILS # BLD AUTO: 5.2 X10(3)/MCL (ref 2.1–9.2)
NEUTROPHILS NFR BLD AUTO: 58.2 %
PLATELET # BLD AUTO: 344 X10(3)/MCL (ref 130–400)
PMV BLD AUTO: 11.4 FL (ref 7.4–10.4)
RBC # BLD AUTO: 4.12 X10(6)/MCL (ref 4.2–5.4)
WBC # SPEC AUTO: 9 X10(3)/MCL (ref 4.5–11.5)

## 2022-10-31 PROCEDURE — 36415 COLL VENOUS BLD VENIPUNCTURE: CPT

## 2022-10-31 PROCEDURE — 85025 COMPLETE CBC W/AUTO DIFF WBC: CPT

## 2022-11-01 ENCOUNTER — OFFICE VISIT (OUTPATIENT)
Dept: HEMATOLOGY/ONCOLOGY | Facility: CLINIC | Age: 32
End: 2022-11-01
Payer: COMMERCIAL

## 2022-11-01 VITALS
OXYGEN SATURATION: 98 % | WEIGHT: 293 LBS | SYSTOLIC BLOOD PRESSURE: 131 MMHG | RESPIRATION RATE: 18 BRPM | HEIGHT: 72 IN | BODY MASS INDEX: 39.68 KG/M2 | TEMPERATURE: 98 F | DIASTOLIC BLOOD PRESSURE: 88 MMHG | HEART RATE: 73 BPM

## 2022-11-01 DIAGNOSIS — D50.0 ANEMIA DUE TO CHRONIC BLOOD LOSS: Primary | ICD-10-CM

## 2022-11-01 PROCEDURE — 99214 PR OFFICE/OUTPT VISIT, EST, LEVL IV, 30-39 MIN: ICD-10-PCS | Mod: S$GLB,,, | Performed by: NURSE PRACTITIONER

## 2022-11-01 PROCEDURE — 1160F PR REVIEW ALL MEDS BY PRESCRIBER/CLIN PHARMACIST DOCUMENTED: ICD-10-PCS | Mod: CPTII,S$GLB,, | Performed by: NURSE PRACTITIONER

## 2022-11-01 PROCEDURE — 99999 PR PBB SHADOW E&M-EST. PATIENT-LVL III: ICD-10-PCS | Mod: PBBFAC,,, | Performed by: NURSE PRACTITIONER

## 2022-11-01 PROCEDURE — 99999 PR PBB SHADOW E&M-EST. PATIENT-LVL III: CPT | Mod: PBBFAC,,, | Performed by: NURSE PRACTITIONER

## 2022-11-01 PROCEDURE — 3075F PR MOST RECENT SYSTOLIC BLOOD PRESS GE 130-139MM HG: ICD-10-PCS | Mod: CPTII,S$GLB,, | Performed by: NURSE PRACTITIONER

## 2022-11-01 PROCEDURE — 3079F PR MOST RECENT DIASTOLIC BLOOD PRESSURE 80-89 MM HG: ICD-10-PCS | Mod: CPTII,S$GLB,, | Performed by: NURSE PRACTITIONER

## 2022-11-01 PROCEDURE — 3008F BODY MASS INDEX DOCD: CPT | Mod: CPTII,S$GLB,, | Performed by: NURSE PRACTITIONER

## 2022-11-01 PROCEDURE — 3079F DIAST BP 80-89 MM HG: CPT | Mod: CPTII,S$GLB,, | Performed by: NURSE PRACTITIONER

## 2022-11-01 PROCEDURE — 1159F MED LIST DOCD IN RCRD: CPT | Mod: CPTII,S$GLB,, | Performed by: NURSE PRACTITIONER

## 2022-11-01 PROCEDURE — 1160F RVW MEDS BY RX/DR IN RCRD: CPT | Mod: CPTII,S$GLB,, | Performed by: NURSE PRACTITIONER

## 2022-11-01 PROCEDURE — 3008F PR BODY MASS INDEX (BMI) DOCUMENTED: ICD-10-PCS | Mod: CPTII,S$GLB,, | Performed by: NURSE PRACTITIONER

## 2022-11-01 PROCEDURE — 3075F SYST BP GE 130 - 139MM HG: CPT | Mod: CPTII,S$GLB,, | Performed by: NURSE PRACTITIONER

## 2022-11-01 PROCEDURE — 99214 OFFICE O/P EST MOD 30 MIN: CPT | Mod: S$GLB,,, | Performed by: NURSE PRACTITIONER

## 2022-11-01 PROCEDURE — 1159F PR MEDICATION LIST DOCUMENTED IN MEDICAL RECORD: ICD-10-PCS | Mod: CPTII,S$GLB,, | Performed by: NURSE PRACTITIONER

## 2022-11-01 NOTE — PROGRESS NOTES
[facility] Hematology Consult Note    Referring provider: No ref. provider found    Subjective:       Patient ID: Orin Carcamo is a 32 y.o. female.    .refe  Chief Complaint: MICROCYTIC ANEMIA (Pt reports stomach pain and fatigue is getting better.)    Ms. Carcamo is a 32 y.o. year old AA female who was referred to our service by Dr. Sutherland  for anemia.     She has 2 kids and her cycles became heavier since the last child. She admits prior to September her menstrual cycle continued for 2 months straight. The bleeding was heavy with large clots and she would use 1 pack of pads per day. She was given a Depo shot 9/9/22 which stopped the bleeding, but she had another cycle the week of 10/10/22 which was light per the patient. She had been taking oral iron 1 pill daily which caused her stomach upset and chills. She complained of shortness of breath, cold intolerance, and severe fatigue. She ate 2 cups of ice a day.         Interval History: 11/1/22 Since last visit, she was transfused with 2 units of blood at our direction for a Hgb of 6, and symptomatic, she was also started on venofer and will receive the 3rd dose tomorrow.  She has seen GYN and was started on antibiotics for an infection, the patient is unsure of the details or name of the antibiotics. She also underwent US and was found with ovarian cysts. She reports upon completion of her antibiotics she will have Mirena IUD placed.  Her shortness of breath has improved and she is able to  the shower. She does remain fatigued at this time. She has not had any vaginal bleeding since the week of 10/10/22.     Past Medical History:   Diagnosis Date    Anemia, unspecified     HTN (hypertension)     Iron deficiency anemia due to chronic blood loss 10/18/2022      Review of patient's allergies indicates:  No Known Allergies   Current Outpatient Medications on File Prior to Visit   Medication Sig Dispense Refill    dicyclomine (BENTYL) 20 mg tablet Take 1 tablet by  mouth 2 (two) times daily.      [DISCONTINUED] ferrous sulfate 325 (65 FE) MG EC tablet Take 1 tablet by mouth 3 (three) times daily.       Current Facility-Administered Medications on File Prior to Visit   Medication Dose Route Frequency Provider Last Rate Last Admin    sodium chloride 0.9% flush 10 mL  10 mL Intravenous PRN Thalia Andrew NP          Social History     Socioeconomic History    Marital status: Single   Tobacco Use    Smoking status: Never    Smokeless tobacco: Never   Substance and Sexual Activity    Alcohol use: Yes    Drug use: Never    Sexual activity: Never     Family History   Problem Relation Age of Onset    Diabetes Mother                 Review of Systems  All 12 review of systems negative except as mentioned in HPI.     Physical Exam  Gen: Well appearing, appears stated age, in no acute distress  CV: RRR, no murmurs / thrills auscultated  Pulm: CTA bilaterally, normal respiratory effort  GI: Nondistended, nontender to palpation  Skin: No rashes noted, warm to touch  Extremities: No edema noted.   MSK: Normal ROM.   Psych: Normal affect, normal speech and thought content  Neuro: AAO x 4, no focal deficits.       Lab Visit on 10/31/2022   Component Date Value    WBC 10/31/2022 9.0     RBC 10/31/2022 4.12 (L)     Hgb 10/31/2022 8.6 (L)     Hct 10/31/2022 31.6 (L)     MCV 10/31/2022 76.7 (L)     MCH 10/31/2022 20.9 (L)     MCHC 10/31/2022 27.2 (L)     RDW 10/31/2022 23.7 (H)     Platelet 10/31/2022 344     MPV 10/31/2022 11.4 (H)     Neut % 10/31/2022 58.2     Lymph % 10/31/2022 30.7     Mono % 10/31/2022 7.1     Eos % 10/31/2022 2.7     Basophil % 10/31/2022 1.0     Lymph # 10/31/2022 2.77     Neut # 10/31/2022 5.2     Mono # 10/31/2022 0.64     Eos # 10/31/2022 0.24     Baso # 10/31/2022 0.09     IG# 10/31/2022 0.03     IG% 10/31/2022 0.3    Infusion on 10/20/2022   Component Date Value    Group & Rh 10/20/2022 O POS     Indirect Leslye GEL 10/20/2022 NEG     UNIT NUMBER 10/20/2022  [id number]     UNIT ABO/RH 10/20/2022 O POS     DISPENSE STATUS 10/20/2022 Transfused     Unit Expiration 10/20/2022 [id number]     Product Code 10/20/2022 [id number]     Unit Blood Type Code 10/20/2022 5100     CROSSMATCH INTERPRETATION 10/20/2022 Compatible     UNIT NUMBER 10/20/2022 [id number]     UNIT ABO/RH 10/20/2022 O POS     DISPENSE STATUS 10/20/2022 Transfused     Unit Expiration 10/20/2022 [id number]     Product Code 10/20/2022 [id number]     Unit Blood Type Code 10/20/2022 5100     CROSSMATCH INTERPRETATION 10/20/2022 Compatible         US Pelvis Complete Non OB  Narrative: EXAMINATION:  ULTRASOUND PELVIS NON OB COMPLETE:    CLINICAL HISTORY:  Other iron deficiency anemias,    COMPARISON:  09/14/2016    FINDINGS:  Transabdominal  scanning was performed. Multiple sonographic images reveal the uterus to be prominent size measuring 4.3 x 5.4 x 6.3 cm..  The right ovary measures 2.8 x 1.5 x 3.0 cm and demonstrates venous flow.  The left ovary measures 3.2 x 2.1 x 2.1 cm and demonstrates venous flow.   The endometrium thickened measuring 2.6 cm.  No free fluid is seen within the cul-de-sac. No abnormal adnexal mass is seen.  Impression: 1. Prominent uterus with thickened endometrium measuring 2.6 cm.  Clinical correlation is indicated    Electronically signed by: Fermin Moncada  Date:    09/26/2022  Time:    14:07          Assessment:       1. Anemia due to chronic blood loss     Today her Hgb has improved from 6.0-->8.6 s/p 2u PRBC Given October 2022. Given her Ferritin of  6.96, most likely related to menorrhagia we have started her on Venofer x 5 doses. She will receive the 3rd dose tomorrow.     We have discontinued PO iron due to her poor tolerance. She will further discuss with GYN regarding her menorrhagia and treatment options. She plans to undergo Mirena placement as well upon clearance of her Gyn infection.       Plan:       RTC in 7 weeks with MD and CBC, iron panel  Complete Venofer x 5      Educated patient on when to call for further evaluation or to go to ER.       Therapy Plan Information  Medications  iron sucrose (VENOFER) 200 mg in sodium chloride 0.9% 100 mL IVPB  200 mg, Intravenous, 2 times a week, at least 2 days apart  Flushes  sodium chloride 0.9% 250 mL flush bag  Intravenous, 1 time a week  sodium chloride 0.9% flush 10 mL  10 mL, Intravenous, 1 time a week  heparin, porcine (PF) 100 unit/mL injection flush 500 Units  500 Units, Intravenous, 1 time a week  alteplase injection 2 mg  2 mg, Intra-Catheter, 1 time a week                 Answers submitted by the patient for this visit:  Review of Systems Questionnaire (Submitted on 10/31/2022)  appetite change : No  unexpected weight change: No  mouth sores: No  visual disturbance: No  cough: No  shortness of breath: No  chest pain: No  abdominal pain: No  diarrhea: No  frequency: No  back pain: Yes  rash: No  headaches: No  adenopathy: No  nervous/ anxious: No

## 2022-11-02 ENCOUNTER — INFUSION (OUTPATIENT)
Dept: INFUSION THERAPY | Facility: HOSPITAL | Age: 32
End: 2022-11-02
Payer: COMMERCIAL

## 2022-11-02 VITALS
BODY MASS INDEX: 39.68 KG/M2 | HEART RATE: 81 BPM | HEIGHT: 72 IN | DIASTOLIC BLOOD PRESSURE: 94 MMHG | SYSTOLIC BLOOD PRESSURE: 146 MMHG | WEIGHT: 293 LBS | RESPIRATION RATE: 18 BRPM | OXYGEN SATURATION: 98 % | TEMPERATURE: 98 F

## 2022-11-02 DIAGNOSIS — D50.0 IRON DEFICIENCY ANEMIA DUE TO CHRONIC BLOOD LOSS: Primary | ICD-10-CM

## 2022-11-02 PROCEDURE — 25000003 PHARM REV CODE 250: Performed by: NURSE PRACTITIONER

## 2022-11-02 PROCEDURE — 96365 THER/PROPH/DIAG IV INF INIT: CPT

## 2022-11-02 PROCEDURE — 63600175 PHARM REV CODE 636 W HCPCS: Performed by: NURSE PRACTITIONER

## 2022-11-02 RX ORDER — SODIUM CHLORIDE 0.9 % (FLUSH) 0.9 %
10 SYRINGE (ML) INJECTION
Status: CANCELLED | OUTPATIENT
Start: 2022-11-04

## 2022-11-02 RX ORDER — HEPARIN 100 UNIT/ML
500 SYRINGE INTRAVENOUS
Status: CANCELLED | OUTPATIENT
Start: 2022-11-04

## 2022-11-02 RX ORDER — HEPARIN 100 UNIT/ML
500 SYRINGE INTRAVENOUS
Status: DISCONTINUED | OUTPATIENT
Start: 2022-11-02 | End: 2023-01-24

## 2022-11-02 RX ORDER — SODIUM CHLORIDE 0.9 % (FLUSH) 0.9 %
10 SYRINGE (ML) INJECTION
Status: DISCONTINUED | OUTPATIENT
Start: 2022-11-02 | End: 2023-01-24

## 2022-11-02 RX ADMIN — IRON SUCROSE 200 MG: 20 INJECTION, SOLUTION INTRAVENOUS at 08:11

## 2022-11-04 ENCOUNTER — INFUSION (OUTPATIENT)
Dept: INFUSION THERAPY | Facility: HOSPITAL | Age: 32
End: 2022-11-04
Payer: COMMERCIAL

## 2022-11-04 VITALS
OXYGEN SATURATION: 98 % | TEMPERATURE: 98 F | WEIGHT: 293 LBS | BODY MASS INDEX: 39.68 KG/M2 | RESPIRATION RATE: 18 BRPM | DIASTOLIC BLOOD PRESSURE: 84 MMHG | SYSTOLIC BLOOD PRESSURE: 126 MMHG | HEART RATE: 74 BPM | HEIGHT: 72 IN

## 2022-11-04 DIAGNOSIS — D50.0 IRON DEFICIENCY ANEMIA DUE TO CHRONIC BLOOD LOSS: Primary | ICD-10-CM

## 2022-11-04 PROCEDURE — 63600175 PHARM REV CODE 636 W HCPCS: Performed by: NURSE PRACTITIONER

## 2022-11-04 PROCEDURE — 96365 THER/PROPH/DIAG IV INF INIT: CPT

## 2022-11-04 PROCEDURE — 25000003 PHARM REV CODE 250: Performed by: NURSE PRACTITIONER

## 2022-11-04 RX ORDER — SODIUM CHLORIDE 0.9 % (FLUSH) 0.9 %
10 SYRINGE (ML) INJECTION
Status: CANCELLED | OUTPATIENT
Start: 2022-11-09

## 2022-11-04 RX ORDER — HEPARIN 100 UNIT/ML
500 SYRINGE INTRAVENOUS
Status: CANCELLED | OUTPATIENT
Start: 2022-11-09

## 2022-11-04 RX ADMIN — IRON SUCROSE 200 MG: 20 INJECTION, SOLUTION INTRAVENOUS at 09:11

## 2022-11-09 ENCOUNTER — INFUSION (OUTPATIENT)
Dept: INFUSION THERAPY | Facility: HOSPITAL | Age: 32
End: 2022-11-09
Payer: COMMERCIAL

## 2022-11-09 VITALS
RESPIRATION RATE: 18 BRPM | BODY MASS INDEX: 39.68 KG/M2 | WEIGHT: 293 LBS | HEIGHT: 72 IN | SYSTOLIC BLOOD PRESSURE: 162 MMHG | TEMPERATURE: 98 F | DIASTOLIC BLOOD PRESSURE: 99 MMHG | HEART RATE: 79 BPM

## 2022-11-09 DIAGNOSIS — D50.0 IRON DEFICIENCY ANEMIA DUE TO CHRONIC BLOOD LOSS: Primary | ICD-10-CM

## 2022-11-09 PROCEDURE — 63600175 PHARM REV CODE 636 W HCPCS: Performed by: NURSE PRACTITIONER

## 2022-11-09 PROCEDURE — 25000003 PHARM REV CODE 250: Performed by: NURSE PRACTITIONER

## 2022-11-09 PROCEDURE — 96365 THER/PROPH/DIAG IV INF INIT: CPT

## 2022-11-09 RX ORDER — SODIUM CHLORIDE 0.9 % (FLUSH) 0.9 %
10 SYRINGE (ML) INJECTION
Status: CANCELLED | OUTPATIENT
Start: 2022-11-16

## 2022-11-09 RX ORDER — HEPARIN 100 UNIT/ML
500 SYRINGE INTRAVENOUS
Status: CANCELLED | OUTPATIENT
Start: 2022-11-16

## 2022-11-09 RX ORDER — HEPARIN 100 UNIT/ML
500 SYRINGE INTRAVENOUS
Status: DISCONTINUED | OUTPATIENT
Start: 2022-11-09 | End: 2023-01-24

## 2022-11-09 RX ORDER — SODIUM CHLORIDE 0.9 % (FLUSH) 0.9 %
10 SYRINGE (ML) INJECTION
Status: DISCONTINUED | OUTPATIENT
Start: 2022-11-09 | End: 2023-01-24

## 2022-11-09 RX ADMIN — IRON SUCROSE 200 MG: 20 INJECTION, SOLUTION INTRAVENOUS at 09:11

## 2022-11-09 RX ADMIN — SODIUM CHLORIDE: 9 INJECTION, SOLUTION INTRAVENOUS at 08:11

## 2022-11-09 NOTE — PLAN OF CARE
Personal items kept at chairside.  Treatment area free of clutter.  Assist with ambulation as needed.

## 2023-01-03 NOTE — PROGRESS NOTES
[facility] Hematology Consult Note    Referring provider: No ref. provider found    Subjective:       Patient ID: Orin Carcamo is a 32 y.o. female.    .refe  Chief Complaint: Anemia (Pt reports no new issues/concerns at this time.)      Ms. Carcamo is a 32 y.o. year old AA female who was referred to our service by Dr. Sutherland  for anemia.     She has 2 kids and her cycles became heavier since the last child. She admits prior to September her menstrual cycle continued for 2 months straight. The bleeding was heavy with large clots and she would use 1 pack of pads per day. She was given a Depo shot 9/9/22 which stopped the bleeding, but she had another cycle the week of 10/10/22 which was light per the patient. She had been taking oral iron 1 pill daily which caused her stomach upset and chills. She complained of shortness of breath, cold intolerance, and severe fatigue. She ate 2 cups of ice a day.         Interval History:     Patient here for follow-up after getting 5 doses of IV Venofer.  She denies any chest pain, palpitation, shortness of breath.  Patient still have some vaginal bleeding but it had improved.  Denies any blood in the urine or bowel movements        11/1/22 Since last visit, she was transfused with 2 units of blood at our direction for a Hgb of 6, and symptomatic, she was also started on venofer and will receive the 3rd dose tomorrow.  She has seen GYN and was started on antibiotics for an infection, the patient is unsure of the details or name of the antibiotics. She also underwent US and was found with ovarian cysts. She reports upon completion of her antibiotics she will have Mirena IUD placed.  Her shortness of breath has improved and she is able to  the shower. She does remain fatigued at this time. She has not had any vaginal bleeding since the week of 10/10/22.     Past Medical History:   Diagnosis Date    Anemia, unspecified     HTN (hypertension)     Iron deficiency anemia due to  chronic blood loss 10/18/2022      Review of patient's allergies indicates:  No Known Allergies   Current Outpatient Medications on File Prior to Visit   Medication Sig Dispense Refill    dicyclomine (BENTYL) 20 mg tablet Take 1 tablet by mouth 2 (two) times daily.      doxycycline (VIBRAMYCIN) 100 MG Cap TAKE 1 CAPSULE BY MOUTH TWICE DAILY FOR 14 DAYS may take WITH food TO minimize abdominal discomfortavoid alcohol       Current Facility-Administered Medications on File Prior to Visit   Medication Dose Route Frequency Provider Last Rate Last Admin    alteplase injection 2 mg  2 mg Intra-Catheter PRN Thalia Andrew, MELISSA        alteplase injection 2 mg  2 mg Intra-Catheter PRN Thalia Andrew, MELISSA        heparin, porcine (PF) 100 unit/mL injection flush 500 Units  500 Units Intravenous PRN Thalia Andrew, MELISSA        heparin, porcine (PF) 100 unit/mL injection flush 500 Units  500 Units Intravenous PRN Thalia Andrew, MELISSA        sodium chloride 0.9% 250 mL flush bag   Intravenous 1 time in Clinic/HOD Thalia Andrew NP        sodium chloride 0.9% flush 10 mL  10 mL Intravenous PRN Thalia Andrew, MELISSA        sodium chloride 0.9% flush 10 mL  10 mL Intravenous PRN Thalia Andrew, MELISSA        sodium chloride 0.9% flush 10 mL  10 mL Intravenous PRN Thalia Andrew, MELISSA          Social History     Socioeconomic History    Marital status: Single   Tobacco Use    Smoking status: Never    Smokeless tobacco: Never   Substance and Sexual Activity    Alcohol use: Yes    Drug use: Never    Sexual activity: Never     Family History   Problem Relation Age of Onset    Diabetes Mother                 Review of Systems  All 12 review of systems negative except as mentioned in HPI.     Physical Exam  Gen: Well appearing, appears stated age, in no acute distress  CV: RRR, no murmurs / thrills auscultated  Pulm: CTA bilaterally, normal respiratory effort  GI: Nondistended, nontender to  palpation  Skin: No rashes noted, warm to touch  Extremities: No edema noted.   MSK: Normal ROM.   Psych: Normal affect, normal speech and thought content  Neuro: AAO x 4, no focal deficits.       Lab Visit on 01/04/2023   Component Date Value    Sodium Level 01/04/2023 140     Potassium Level 01/04/2023 3.9     Chloride 01/04/2023 108 (H)     Carbon Dioxide 01/04/2023 24     Glucose Level 01/04/2023 82     Blood Urea Nitrogen 01/04/2023 13.0     Creatinine 01/04/2023 0.83     Calcium Level Total 01/04/2023 9.0     Protein Total 01/04/2023 7.4     Albumin Level 01/04/2023 3.6     Globulin 01/04/2023 3.8 (H)     Albumin/Globulin Ratio 01/04/2023 0.9 (L)     Bilirubin Total 01/04/2023 0.6     Alkaline Phosphatase 01/04/2023 85     Alanine Aminotransferase 01/04/2023 10     Aspartate Aminotransfera* 01/04/2023 11     eGFR 01/04/2023 >90     Ferritin Level 01/04/2023 24.45     Iron Binding Capacity Un* 01/04/2023 351 (H)     Iron Level 01/04/2023 34 (L)     Iron Binding Capacity To* 01/04/2023 385     Iron Saturation 01/04/2023 9 (L)     WBC 01/04/2023 6.7     RBC 01/04/2023 4.31     Hgb 01/04/2023 11.4 (L)     Hct 01/04/2023 38.0     MCV 01/04/2023 88.2     MCH 01/04/2023 26.5     MCHC 01/04/2023 30.0 (L)     RDW 01/04/2023 20.0 (H)     Platelet 01/04/2023 279     MPV 01/04/2023 11.3     Neut % 01/04/2023 48.1     Lymph % 01/04/2023 41.3     Mono % 01/04/2023 7.6     Eos % 01/04/2023 1.9     Basophil % 01/04/2023 1.0     Lymph # 01/04/2023 2.78     Neut # 01/04/2023 3.23     Mono # 01/04/2023 0.51     Eos # 01/04/2023 0.13     Baso # 01/04/2023 0.07     IG# 01/04/2023 0.01     IG% 01/04/2023 0.1         US Pelvis Complete Non OB  Narrative: EXAMINATION:  ULTRASOUND PELVIS NON OB COMPLETE:    CLINICAL HISTORY:  Other iron deficiency anemias,    COMPARISON:  09/14/2016    FINDINGS:  Transabdominal  scanning was performed. Multiple sonographic images reveal the uterus to be prominent size measuring 4.3 x 5.4 x 6.3 cm..   The right ovary measures 2.8 x 1.5 x 3.0 cm and demonstrates venous flow.  The left ovary measures 3.2 x 2.1 x 2.1 cm and demonstrates venous flow.   The endometrium thickened measuring 2.6 cm.  No free fluid is seen within the cul-de-sac. No abnormal adnexal mass is seen.  Impression: 1. Prominent uterus with thickened endometrium measuring 2.6 cm.  Clinical correlation is indicated    Electronically signed by: Fermin Moncada  Date:    09/26/2022  Time:    14:07            Assessment:       GABRIELA:  - Hgb improved from 6.0-->8.6 s/p 2u PRBC   - Treated with IV Venofer x 5 doses (11/2022)  - discontinued PO iron due to her poor tolerance      Plan:    - LABS reviewed, will treat with IV IRON dextran 1 gm   - RTC in 5 weeks with MD / LABS / TREATMENT VISIT     The patient was seen, interviewed and examined. Pertinent lab and radiology studies were reviewed. Pt instructed to call should develop concerning signs/symptoms or have further questions.       Portions of the record may have been created with voice recognition software. Occasional wrong-word or sound-a-like substitutions may have occurred due to the inherent limitations of voice recognition software. Read the chart carefully and recognize, using context, where substitutions have occurred.    Susan Farley MD  Hematology / Oncology

## 2023-01-04 ENCOUNTER — OFFICE VISIT (OUTPATIENT)
Dept: HEMATOLOGY/ONCOLOGY | Facility: CLINIC | Age: 33
End: 2023-01-04
Payer: COMMERCIAL

## 2023-01-04 VITALS
OXYGEN SATURATION: 99 % | HEIGHT: 72 IN | RESPIRATION RATE: 18 BRPM | WEIGHT: 293 LBS | HEART RATE: 80 BPM | SYSTOLIC BLOOD PRESSURE: 128 MMHG | TEMPERATURE: 98 F | DIASTOLIC BLOOD PRESSURE: 88 MMHG | BODY MASS INDEX: 39.68 KG/M2

## 2023-01-04 DIAGNOSIS — D50.0 IRON DEFICIENCY ANEMIA DUE TO CHRONIC BLOOD LOSS: Primary | ICD-10-CM

## 2023-01-04 PROCEDURE — 99999 PR PBB SHADOW E&M-EST. PATIENT-LVL IV: CPT | Mod: PBBFAC,,, | Performed by: INTERNAL MEDICINE

## 2023-01-04 PROCEDURE — 3074F SYST BP LT 130 MM HG: CPT | Mod: CPTII,S$GLB,, | Performed by: INTERNAL MEDICINE

## 2023-01-04 PROCEDURE — 1159F MED LIST DOCD IN RCRD: CPT | Mod: CPTII,S$GLB,, | Performed by: INTERNAL MEDICINE

## 2023-01-04 PROCEDURE — 3079F PR MOST RECENT DIASTOLIC BLOOD PRESSURE 80-89 MM HG: ICD-10-PCS | Mod: CPTII,S$GLB,, | Performed by: INTERNAL MEDICINE

## 2023-01-04 PROCEDURE — 3008F BODY MASS INDEX DOCD: CPT | Mod: CPTII,S$GLB,, | Performed by: INTERNAL MEDICINE

## 2023-01-04 PROCEDURE — 99214 OFFICE O/P EST MOD 30 MIN: CPT | Mod: S$GLB,,, | Performed by: INTERNAL MEDICINE

## 2023-01-04 PROCEDURE — 99214 PR OFFICE/OUTPT VISIT, EST, LEVL IV, 30-39 MIN: ICD-10-PCS | Mod: S$GLB,,, | Performed by: INTERNAL MEDICINE

## 2023-01-04 PROCEDURE — 3079F DIAST BP 80-89 MM HG: CPT | Mod: CPTII,S$GLB,, | Performed by: INTERNAL MEDICINE

## 2023-01-04 PROCEDURE — 99999 PR PBB SHADOW E&M-EST. PATIENT-LVL IV: ICD-10-PCS | Mod: PBBFAC,,, | Performed by: INTERNAL MEDICINE

## 2023-01-04 PROCEDURE — 3074F PR MOST RECENT SYSTOLIC BLOOD PRESSURE < 130 MM HG: ICD-10-PCS | Mod: CPTII,S$GLB,, | Performed by: INTERNAL MEDICINE

## 2023-01-04 PROCEDURE — 1159F PR MEDICATION LIST DOCUMENTED IN MEDICAL RECORD: ICD-10-PCS | Mod: CPTII,S$GLB,, | Performed by: INTERNAL MEDICINE

## 2023-01-04 PROCEDURE — 3008F PR BODY MASS INDEX (BMI) DOCUMENTED: ICD-10-PCS | Mod: CPTII,S$GLB,, | Performed by: INTERNAL MEDICINE

## 2023-01-04 RX ORDER — SODIUM CHLORIDE 0.9 % (FLUSH) 0.9 %
10 SYRINGE (ML) INJECTION
Status: CANCELLED | OUTPATIENT
Start: 2023-01-11

## 2023-01-04 RX ORDER — DOXYCYCLINE 100 MG/1
CAPSULE ORAL
Status: ON HOLD | COMMUNITY
Start: 2022-12-23 | End: 2023-01-24

## 2023-01-04 RX ORDER — ACETAMINOPHEN 325 MG/1
650 TABLET ORAL
Status: CANCELLED | OUTPATIENT
Start: 2023-01-11

## 2023-01-04 RX ORDER — HEPARIN 100 UNIT/ML
500 SYRINGE INTRAVENOUS
Status: CANCELLED | OUTPATIENT
Start: 2023-01-11

## 2023-01-13 ENCOUNTER — DOCUMENTATION ONLY (OUTPATIENT)
Dept: ADMINISTRATIVE | Facility: HOSPITAL | Age: 33
End: 2023-01-13
Payer: COMMERCIAL

## 2023-01-23 ENCOUNTER — LAB VISIT (OUTPATIENT)
Dept: LAB | Facility: HOSPITAL | Age: 33
End: 2023-01-23
Attending: OBSTETRICS & GYNECOLOGY
Payer: COMMERCIAL

## 2023-01-23 ENCOUNTER — ANESTHESIA EVENT (OUTPATIENT)
Dept: SURGERY | Facility: HOSPITAL | Age: 33
End: 2023-01-23
Payer: COMMERCIAL

## 2023-01-23 DIAGNOSIS — Z22.39: ICD-10-CM

## 2023-01-23 DIAGNOSIS — E66.01 MORBID OBESITY: ICD-10-CM

## 2023-01-23 DIAGNOSIS — N73.9 FEMALE PELVIC INFLAMMATION: ICD-10-CM

## 2023-01-23 DIAGNOSIS — N93.9 VAGINA BLEEDING: Primary | ICD-10-CM

## 2023-01-23 LAB — B-HCG SERPL QL: NEGATIVE

## 2023-01-23 PROCEDURE — 81025 URINE PREGNANCY TEST: CPT

## 2023-01-23 PROCEDURE — 86900 BLOOD TYPING SEROLOGIC ABO: CPT | Performed by: OBSTETRICS & GYNECOLOGY

## 2023-01-23 PROCEDURE — 36415 COLL VENOUS BLD VENIPUNCTURE: CPT

## 2023-01-23 NOTE — ANESTHESIA PREPROCEDURE EVALUATION
01/23/2023  Orin Carcamo is a 32 y.o., female.      Pre-op Assessment    I have reviewed the Patient Summary Reports.     I have reviewed the Nursing Notes. I have reviewed the NPO Status.   I have reviewed the Medications.     Review of Systems  Anesthesia Hx:  No problems with previous Anesthesia  Denies Family Hx of Anesthesia complications.   Denies Personal Hx of Anesthesia complications.   Hematology/Oncology:     Oncology Normal    -- Anemia: Iron Deficiency Anemia   EENT/Dental:EENT/Dental Normal   Cardiovascular:  Cardiovascular Normal Exercise tolerance: good   Hypertension    Pulmonary:  Pulmonary Normal    Renal/:  Renal/ Normal     Hepatic/GI:  Hepatic/GI Normal    Musculoskeletal:  Musculoskeletal Normal    Neurological:  Neurology Normal    Endocrine:  Endocrine Normal  Morbid Obesity / BMI > 40  Dermatological:  Skin Normal    Psych:  Psychiatric Normal           Physical Exam  General: Well nourished, Cooperative, Alert and Oriented    Airway:  Mallampati: II / II  Mouth Opening: Normal  TM Distance: Normal  Tongue: Normal  Neck ROM: Normal ROM    Dental:  Intact        Anesthesia Plan  Type of Anesthesia, risks & benefits discussed:    Anesthesia Type: Gen ETT  Intra-op Monitoring Plan: Standard ASA Monitors  Post Op Pain Control Plan: multimodal analgesia  Induction:  IV  Airway Plan: Direct  Informed Consent: Informed consent signed with the Patient and all parties understand the risks and agree with anesthesia plan.  All questions answered. Patient consented to blood products? Yes  ASA Score: 2  Day of Surgery Review of History & Physical: H&P Update referred to the surgeon/provider.I have interviewed and examined the patient. I have reviewed the patient's H&P dated: There are no significant changes.     Ready For Surgery From Anesthesia Perspective.     .

## 2023-01-24 ENCOUNTER — ANESTHESIA (OUTPATIENT)
Dept: SURGERY | Facility: HOSPITAL | Age: 33
End: 2023-01-24
Payer: COMMERCIAL

## 2023-01-24 ENCOUNTER — HOSPITAL ENCOUNTER (OUTPATIENT)
Facility: HOSPITAL | Age: 33
Discharge: HOME | End: 2023-01-24
Attending: OBSTETRICS & GYNECOLOGY | Admitting: OBSTETRICS & GYNECOLOGY
Payer: COMMERCIAL

## 2023-01-24 VITALS
HEIGHT: 72 IN | HEART RATE: 63 BPM | TEMPERATURE: 97 F | BODY MASS INDEX: 39.68 KG/M2 | OXYGEN SATURATION: 100 % | DIASTOLIC BLOOD PRESSURE: 98 MMHG | SYSTOLIC BLOOD PRESSURE: 164 MMHG | RESPIRATION RATE: 18 BRPM | WEIGHT: 293 LBS

## 2023-01-24 DIAGNOSIS — N93.9 VAGINAL BLEEDING: ICD-10-CM

## 2023-01-24 DIAGNOSIS — D50.0 IRON DEFICIENCY ANEMIA DUE TO CHRONIC BLOOD LOSS: ICD-10-CM

## 2023-01-24 DIAGNOSIS — Z30.430 ENCOUNTER FOR IUD INSERTION: Primary | ICD-10-CM

## 2023-01-24 DIAGNOSIS — N92.1 MENORRHAGIA WITH IRREGULAR CYCLE: ICD-10-CM

## 2023-01-24 PROBLEM — N92.0 MENORRHAGIA: Status: ACTIVE | Noted: 2023-01-24

## 2023-01-24 LAB
ABO AND RH: NORMAL
ABORH RETYPE: NORMAL
ANTIBODY SCREEN: NORMAL
B-HCG SERPL QL: NEGATIVE

## 2023-01-24 PROCEDURE — 58300 INSERT INTRAUTERINE DEVICE: CPT | Mod: ,,, | Performed by: OBSTETRICS & GYNECOLOGY

## 2023-01-24 PROCEDURE — 58300 PR INSERT INTRAUTERINE DEVICE: ICD-10-PCS | Mod: ,,, | Performed by: OBSTETRICS & GYNECOLOGY

## 2023-01-24 PROCEDURE — 81025 URINE PREGNANCY TEST: CPT

## 2023-01-24 PROCEDURE — 71000016 HC POSTOP RECOV ADDL HR: Performed by: OBSTETRICS & GYNECOLOGY

## 2023-01-24 PROCEDURE — 25000003 PHARM REV CODE 250: Performed by: OBSTETRICS & GYNECOLOGY

## 2023-01-24 PROCEDURE — 71000033 HC RECOVERY, INTIAL HOUR: Performed by: OBSTETRICS & GYNECOLOGY

## 2023-01-24 PROCEDURE — 71000015 HC POSTOP RECOV 1ST HR: Performed by: OBSTETRICS & GYNECOLOGY

## 2023-01-24 PROCEDURE — 63600175 PHARM REV CODE 636 W HCPCS: Performed by: OBSTETRICS & GYNECOLOGY

## 2023-01-24 PROCEDURE — D9220A PRA ANESTHESIA: Mod: ,,, | Performed by: NURSE ANESTHETIST, CERTIFIED REGISTERED

## 2023-01-24 PROCEDURE — 37000009 HC ANESTHESIA EA ADD 15 MINS: Performed by: OBSTETRICS & GYNECOLOGY

## 2023-01-24 PROCEDURE — 36000707: Performed by: OBSTETRICS & GYNECOLOGY

## 2023-01-24 PROCEDURE — D9220A PRA ANESTHESIA: ICD-10-PCS | Mod: ,,, | Performed by: NURSE ANESTHETIST, CERTIFIED REGISTERED

## 2023-01-24 PROCEDURE — 36415 COLL VENOUS BLD VENIPUNCTURE: CPT

## 2023-01-24 PROCEDURE — 63600175 PHARM REV CODE 636 W HCPCS: Performed by: NURSE ANESTHETIST, CERTIFIED REGISTERED

## 2023-01-24 PROCEDURE — 58558 PR HYSTEROSCOPY,W/ENDO BX: ICD-10-PCS | Mod: ,,, | Performed by: OBSTETRICS & GYNECOLOGY

## 2023-01-24 PROCEDURE — 58558 HYSTEROSCOPY BIOPSY: CPT | Mod: ,,, | Performed by: OBSTETRICS & GYNECOLOGY

## 2023-01-24 PROCEDURE — 37000008 HC ANESTHESIA 1ST 15 MINUTES: Performed by: OBSTETRICS & GYNECOLOGY

## 2023-01-24 PROCEDURE — 63600175 PHARM REV CODE 636 W HCPCS

## 2023-01-24 PROCEDURE — 36000706: Performed by: OBSTETRICS & GYNECOLOGY

## 2023-01-24 PROCEDURE — 25000003 PHARM REV CODE 250: Performed by: NURSE ANESTHETIST, CERTIFIED REGISTERED

## 2023-01-24 RX ORDER — SODIUM CHLORIDE, SODIUM LACTATE, POTASSIUM CHLORIDE, CALCIUM CHLORIDE 600; 310; 30; 20 MG/100ML; MG/100ML; MG/100ML; MG/100ML
INJECTION, SOLUTION INTRAVENOUS CONTINUOUS
Status: ACTIVE | OUTPATIENT
Start: 2023-01-24

## 2023-01-24 RX ORDER — ONDANSETRON 4 MG/1
8 TABLET, ORALLY DISINTEGRATING ORAL EVERY 8 HOURS PRN
Status: DISCONTINUED | OUTPATIENT
Start: 2023-01-24 | End: 2023-01-24 | Stop reason: HOSPADM

## 2023-01-24 RX ORDER — DEXTROSE MONOHYDRATE AND SODIUM CHLORIDE 5; .45 G/100ML; G/100ML
INJECTION, SOLUTION INTRAVENOUS CONTINUOUS
Status: ACTIVE | OUTPATIENT
Start: 2023-01-24

## 2023-01-24 RX ORDER — LIDOCAINE HYDROCHLORIDE 10 MG/ML
1 INJECTION INFILTRATION; PERINEURAL ONCE
Status: DISCONTINUED | OUTPATIENT
Start: 2023-01-24 | End: 2023-01-24 | Stop reason: HOSPADM

## 2023-01-24 RX ORDER — SODIUM CHLORIDE 9 MG/ML
INJECTION, SOLUTION INTRAVENOUS CONTINUOUS
Status: ACTIVE | OUTPATIENT
Start: 2023-01-24

## 2023-01-24 RX ORDER — HYDROCODONE BITARTRATE AND ACETAMINOPHEN 5; 325 MG/1; MG/1
1 TABLET ORAL EVERY 4 HOURS PRN
Status: DISCONTINUED | OUTPATIENT
Start: 2023-01-24 | End: 2023-01-24 | Stop reason: HOSPADM

## 2023-01-24 RX ORDER — PROCHLORPERAZINE EDISYLATE 5 MG/ML
5 INJECTION INTRAMUSCULAR; INTRAVENOUS EVERY 6 HOURS PRN
Status: DISCONTINUED | OUTPATIENT
Start: 2023-01-24 | End: 2023-01-24 | Stop reason: HOSPADM

## 2023-01-24 RX ORDER — LIDOCAINE HYDROCHLORIDE 20 MG/ML
INJECTION INTRAVENOUS
Status: DISCONTINUED | OUTPATIENT
Start: 2023-01-24 | End: 2023-01-24

## 2023-01-24 RX ORDER — MIDAZOLAM HYDROCHLORIDE 1 MG/ML
2 INJECTION INTRAMUSCULAR; INTRAVENOUS ONCE AS NEEDED
Status: COMPLETED | OUTPATIENT
Start: 2023-01-24 | End: 2023-01-24

## 2023-01-24 RX ORDER — FENTANYL CITRATE 50 UG/ML
INJECTION, SOLUTION INTRAMUSCULAR; INTRAVENOUS
Status: DISCONTINUED | OUTPATIENT
Start: 2023-01-24 | End: 2023-01-24

## 2023-01-24 RX ORDER — DIPHENHYDRAMINE HCL 25 MG
25 CAPSULE ORAL EVERY 4 HOURS PRN
Status: DISCONTINUED | OUTPATIENT
Start: 2023-01-24 | End: 2023-01-24 | Stop reason: HOSPADM

## 2023-01-24 RX ORDER — DIPHENHYDRAMINE HYDROCHLORIDE 50 MG/ML
25 INJECTION INTRAMUSCULAR; INTRAVENOUS EVERY 4 HOURS PRN
Status: DISCONTINUED | OUTPATIENT
Start: 2023-01-24 | End: 2023-01-24 | Stop reason: HOSPADM

## 2023-01-24 RX ORDER — GLYCOPYRROLATE 0.2 MG/ML
0.2 INJECTION INTRAMUSCULAR; INTRAVENOUS ONCE
Status: COMPLETED | OUTPATIENT
Start: 2023-01-24 | End: 2023-01-24

## 2023-01-24 RX ORDER — FAMOTIDINE 10 MG/ML
20 INJECTION INTRAVENOUS ONCE
Status: DISCONTINUED | OUTPATIENT
Start: 2023-01-24 | End: 2023-01-24 | Stop reason: HOSPADM

## 2023-01-24 RX ORDER — FAMOTIDINE 20 MG/1
20 TABLET, FILM COATED ORAL ONCE
Status: COMPLETED | OUTPATIENT
Start: 2023-01-24 | End: 2023-01-24

## 2023-01-24 RX ORDER — IBUPROFEN 600 MG/1
600 TABLET ORAL EVERY 6 HOURS PRN
Qty: 120 TABLET | Refills: 1 | Status: SHIPPED | OUTPATIENT
Start: 2023-01-24

## 2023-01-24 RX ORDER — IBUPROFEN 600 MG/1
600 TABLET ORAL EVERY 6 HOURS PRN
Status: DISCONTINUED | OUTPATIENT
Start: 2023-01-24 | End: 2023-01-24 | Stop reason: HOSPADM

## 2023-01-24 RX ORDER — VECURONIUM BROMIDE FOR INJECTION 1 MG/ML
INJECTION, POWDER, LYOPHILIZED, FOR SOLUTION INTRAVENOUS
Status: DISCONTINUED | OUTPATIENT
Start: 2023-01-24 | End: 2023-01-24

## 2023-01-24 RX ORDER — PROPOFOL 10 MG/ML
VIAL (ML) INTRAVENOUS
Status: DISCONTINUED | OUTPATIENT
Start: 2023-01-24 | End: 2023-01-24

## 2023-01-24 RX ADMIN — LIDOCAINE HYDROCHLORIDE 100 MG: 20 INJECTION, SOLUTION INTRAVENOUS at 11:01

## 2023-01-24 RX ADMIN — FENTANYL CITRATE 100 MCG: 50 INJECTION, SOLUTION INTRAMUSCULAR; INTRAVENOUS at 12:01

## 2023-01-24 RX ADMIN — FENTANYL CITRATE 150 MCG: 50 INJECTION, SOLUTION INTRAMUSCULAR; INTRAVENOUS at 11:01

## 2023-01-24 RX ADMIN — MIDAZOLAM HYDROCHLORIDE 2 MG: 1 INJECTION, SOLUTION INTRAMUSCULAR; INTRAVENOUS at 11:01

## 2023-01-24 RX ADMIN — SODIUM CHLORIDE, POTASSIUM CHLORIDE, SODIUM LACTATE AND CALCIUM CHLORIDE: 600; 310; 30; 20 INJECTION, SOLUTION INTRAVENOUS at 11:01

## 2023-01-24 RX ADMIN — SODIUM CHLORIDE, POTASSIUM CHLORIDE, SODIUM LACTATE AND CALCIUM CHLORIDE: 600; 310; 30; 20 INJECTION, SOLUTION INTRAVENOUS at 10:01

## 2023-01-24 RX ADMIN — GLYCOPYRROLATE 0.2 MG: 0.2 INJECTION INTRAMUSCULAR; INTRAVENOUS at 10:01

## 2023-01-24 RX ADMIN — CEFAZOLIN 3 G: 1 INJECTION, POWDER, FOR SOLUTION INTRAMUSCULAR; INTRAVENOUS at 11:01

## 2023-01-24 RX ADMIN — VECURONIUM BROMIDE 6 MG: 10 INJECTION, POWDER, FOR SOLUTION INTRAVENOUS at 11:01

## 2023-01-24 RX ADMIN — PROPOFOL 150 MG: 10 INJECTION, EMULSION INTRAVENOUS at 11:01

## 2023-01-24 RX ADMIN — FAMOTIDINE 20 MG: 20 TABLET, FILM COATED ORAL at 10:01

## 2023-01-24 NOTE — PROGRESS NOTES
Patient is resting quietly, No difficulty breathing or swallowing, no s/s of distress noted, SR up with call bell in reach, No s/s of distress noted, Patient is drinking water and tolerating it well, Stable condition

## 2023-01-24 NOTE — ANESTHESIA POSTPROCEDURE EVALUATION
Anesthesia Post Evaluation    Patient: Orin Carcamo    Procedure(s) Performed: Procedure(s) (LRB):  HYSTEROSCOPY, WITH DILATION AND CURETTAGE OF UTERUS (N/A)  INSERTION, INTRAUTERINE DEVICE (N/A)    Final Anesthesia Type: general      Patient location during evaluation: PACU  Patient participation: Yes- Able to Participate  Level of consciousness: awake and alert, awake and oriented  Post-procedure vital signs: reviewed and stable  Pain management: adequate  Airway patency: patent  TINY mitigation strategies: Preoperative initiation of continuous positive airway pressure (CPAP) or non-invasive positive pressure ventilation (NIPPV)  PONV status at discharge: No PONV  Anesthetic complications: no      Cardiovascular status: blood pressure returned to baseline  Respiratory status: unassisted, room air and spontaneous ventilation  Hydration status: euvolemic  Follow-up not needed.          Vitals Value Taken Time   /92 01/24/23 1236   Temp 36.5 °C (97.7 °F) 01/24/23 1020   Pulse 72 01/24/23 1238   Resp 18 01/24/23 1020   SpO2 99 % 01/24/23 1238   Vitals shown include unvalidated device data.      No case tracking events are documented in the log.      Pain/Cb Score: No data recorded

## 2023-01-24 NOTE — DISCHARGE SUMMARY
[facility]  Discharge Note  Short Stay    Procedure(s) (LRB):  HYSTEROSCOPY, WITH DILATION AND CURETTAGE OF UTERUS (N/A)  INSERTION, INTRAUTERINE DEVICE (N/A)      OUTCOME: Patient tolerated treatment/procedure well without complication and is now ready for discharge.    DISPOSITION: Home or Self Care    FINAL DIAGNOSIS:  <principal problem not specified>    FOLLOWUP: In clinic    DISCHARGE INSTRUCTIONS:    Discharge Procedure Orders   Diet general     Pelvic Rest     Call MD for:  temperature >100.4     Call MD for:  persistent nausea and vomiting     Call MD for:  severe uncontrolled pain     Call MD for:  difficulty breathing, headache or visual disturbances     No dressing needed     Activity as tolerated        TIME SPENT ON DISCHARGE: 5 minutes

## 2023-01-24 NOTE — ANESTHESIA PROCEDURE NOTES
Intubation    Date/Time: 1/24/2023 11:57 AM  Performed by: Murtaza Verma CRNA  Authorized by: Murtaza Verma CRNA     Intubation:     Induction:  Intravenous    Intubated:  Postinduction    Mask Ventilation:  Easy mask    Attempts:  1    Attempted By:  CRNA    Method of Intubation:  Direct    Blade:  Cardozo 3    Laryngeal View Grade: Grade I - full view of cords      Difficult Airway Encountered?: No      Complications:  None    Airway Device:  Oral endotracheal tube    Airway Device Size:  7.0    Style/Cuff Inflation:  Cuffed    Secured at:  The teeth    Placement Verified By:  Capnometry    Complicating Factors:  None    Findings Post-Intubation:  BS equal bilateral and atraumatic/condition of teeth unchanged

## 2023-01-24 NOTE — TRANSFER OF CARE
"Anesthesia Transfer of Care Note    Patient: Orin Carcamo    Procedure(s) Performed: Procedure(s) (LRB):  HYSTEROSCOPY, WITH DILATION AND CURETTAGE OF UTERUS (N/A)  INSERTION, INTRAUTERINE DEVICE (N/A)    Patient location: PACU    Anesthesia Type: general    Transport from OR: Transported from OR on room air with adequate spontaneous ventilation    Post pain: adequate analgesia    Post assessment: no apparent anesthetic complications    Post vital signs: stable    Level of consciousness: responds to stimulation and awake    Nausea/Vomiting: no nausea/vomiting    Complications: none    Transfer of care protocol was followed      Last vitals:   Visit Vitals  BP (!) 158/101   Pulse 68   Temp 36.5 °C (97.7 °F) (Temporal)   Resp 18   Ht 6' 1" (1.854 m)   Wt (!) 147.9 kg (326 lb)   LMP 01/18/2023 (Approximate)   SpO2 100%   Breastfeeding No   BMI 43.01 kg/m²     "

## 2023-01-24 NOTE — PROGRESS NOTES
Patient is resting quietly, No difficulty breathing or swallowing, No s/s of distress noted, SR up with call bell in reach, Tolerating diet, Stable condition, Ginger pad in place with minimal bleeding noted,

## 2023-01-24 NOTE — OP NOTE
Procedure:    1. Hysteroscope Dilation and Curettage   2. Insertion of Mirena IUD     Pre-op Diagnosis:    1. Abnormal uterine bleeding   2. Obesity    Estimated Blood Loss:  0 cc    Findings:   Normal vagina.  Cervix normal, mobile with decent to approximately 3 cm from the introitus.  Uterus sounded to 9.5cm.  Fluffy appearing endometrium noted.     Procedure:   After consent were reviewed, the patient was taken to the operative room and placed on the OR table.  A time out  was held and after general anesthesia was induced.    The patient was placed in the dorsal lithotomy position and prepped and draped in the standard sterile fashion.  A weighted speculum was placed in the posterior vagina.  The cervix was identified and grasped with a single tooth tenaculum.  The uterus was sounded to 9.5cm.  The cervix was then dilated to allow passage of a rigid hysteroscope.  The scope was inserted.  Normal appearing, fluffy endometrium was noted.  The scope was removed.  The endometrium was curetted until gritty texture was noted throughput the cavity. Specimen was handed off.  An endocervical curetting was performed and handed off separately.     The Mirena IUD was opened and carefully inserted to the fundus.  The insertion device was removed.  Strings were trimmed to protrude approximately 2 cm from the cervical os.    At that point, the procedure was terminated.  All instruments were removed.  Counts were correct.  Patient was awakened and taken to the recovery room in stable condition having tolerated the procedure well.

## 2023-01-24 NOTE — DISCHARGE INSTRUCTIONS
No driving or operating heavy machinery for 24 hours  Notify physician for any signs of infection: fever, uncontrolled pain, excessive bright red bleeding,   No intercourse, douching, or tampons until released from MD  No heavy lifting or straining, No pushing, or pulling, Showers only, No tub baths  Call 911 or report to the ER with any difficulty breathing or swallowing

## 2023-01-24 NOTE — PROGRESS NOTES
Patient is resting quietly, No difficulty breathing or swallowing, No s/s of distress noted, SR up with call bell in reach, Stable condition, Denies pain

## 2023-01-24 NOTE — PROGRESS NOTES
Patient is back to her room in stable condition, No difficulty breathing or swallowing, No s/s of distress noted, SR up with call bell in reach, Denies pain, Ginger pad in place with minimal bleeding noted to pad

## 2023-01-24 NOTE — PLAN OF CARE
Patient ambulated to restroom with assistance of RN x 2, Voided with minimal bleeding noted, Dressed in her own clothes, Ambulated back to bed in stable condition, No s/s of distress noted

## 2023-01-25 ENCOUNTER — INFUSION (OUTPATIENT)
Dept: INFUSION THERAPY | Facility: HOSPITAL | Age: 33
End: 2023-01-25
Payer: COMMERCIAL

## 2023-01-25 VITALS
TEMPERATURE: 98 F | BODY MASS INDEX: 39.68 KG/M2 | SYSTOLIC BLOOD PRESSURE: 145 MMHG | RESPIRATION RATE: 20 BRPM | HEART RATE: 87 BPM | DIASTOLIC BLOOD PRESSURE: 100 MMHG | WEIGHT: 293 LBS | HEIGHT: 72 IN

## 2023-01-25 DIAGNOSIS — D50.0 IRON DEFICIENCY ANEMIA DUE TO CHRONIC BLOOD LOSS: Primary | ICD-10-CM

## 2023-01-25 PROCEDURE — 96365 THER/PROPH/DIAG IV INF INIT: CPT

## 2023-01-25 PROCEDURE — 96376 TX/PRO/DX INJ SAME DRUG ADON: CPT

## 2023-01-25 PROCEDURE — 96367 TX/PROPH/DG ADDL SEQ IV INF: CPT

## 2023-01-25 PROCEDURE — 25000003 PHARM REV CODE 250: Performed by: INTERNAL MEDICINE

## 2023-01-25 PROCEDURE — 63600175 PHARM REV CODE 636 W HCPCS: Mod: JG | Performed by: INTERNAL MEDICINE

## 2023-01-25 RX ORDER — SODIUM CHLORIDE 0.9 % (FLUSH) 0.9 %
10 SYRINGE (ML) INJECTION
Status: ACTIVE | OUTPATIENT
Start: 2023-01-25

## 2023-01-25 RX ORDER — ACETAMINOPHEN 325 MG/1
650 TABLET ORAL
Status: COMPLETED | OUTPATIENT
Start: 2023-01-25 | End: 2023-01-25

## 2023-01-25 RX ORDER — HEPARIN 100 UNIT/ML
500 SYRINGE INTRAVENOUS
Status: ACTIVE | OUTPATIENT
Start: 2023-01-25

## 2023-01-25 RX ADMIN — SODIUM CHLORIDE 25 MG: 9 INJECTION, SOLUTION INTRAVENOUS at 09:01

## 2023-01-25 RX ADMIN — FAMOTIDINE 20 MG: 10 INJECTION INTRAVENOUS at 10:01

## 2023-01-25 RX ADMIN — ACETAMINOPHEN 650 MG: 325 TABLET ORAL at 09:01

## 2023-01-25 RX ADMIN — SODIUM CHLORIDE 975 MG: 9 INJECTION, SOLUTION INTRAVENOUS at 11:01

## 2023-01-25 RX ADMIN — DIPHENHYDRAMINE HYDROCHLORIDE 50 MG: 50 INJECTION INTRAMUSCULAR; INTRAVENOUS at 10:01

## 2023-02-02 DIAGNOSIS — D50.0 IRON DEFICIENCY ANEMIA DUE TO CHRONIC BLOOD LOSS: Primary | ICD-10-CM

## 2023-02-22 ENCOUNTER — TELEPHONE (OUTPATIENT)
Dept: OBSTETRICS AND GYNECOLOGY | Facility: CLINIC | Age: 33
End: 2023-02-22
Payer: COMMERCIAL

## 2023-02-22 NOTE — TELEPHONE ENCOUNTER
----- Message from Jazzy Abarca sent at 2/22/2023  1:56 PM CST -----  Regarding: Patient Appointment  Type:  Patient Returning Call    Who Called:Patient  Who Left Message for Patient:Patient  Does the patient know what this is regarding?:  Would the patient rather a call back or a response via [employer]? Call Back  Best Call Back Number:[phone]  Additional Information: Pt had surgery 1/24/2023. Asking to schedule post op due to missing it twice. Next availability is about a month out.

## 2023-02-22 NOTE — TELEPHONE ENCOUNTER
Called pt, shes having no problems or issues so ok to schedule her post op for next available appt time

## 2023-03-02 ENCOUNTER — LAB VISIT (OUTPATIENT)
Dept: LAB | Facility: HOSPITAL | Age: 33
End: 2023-03-02
Attending: INTERNAL MEDICINE
Payer: COMMERCIAL

## 2023-03-02 ENCOUNTER — OFFICE VISIT (OUTPATIENT)
Dept: HEMATOLOGY/ONCOLOGY | Facility: CLINIC | Age: 33
End: 2023-03-02
Payer: COMMERCIAL

## 2023-03-02 VITALS
BODY MASS INDEX: 39.68 KG/M2 | HEART RATE: 78 BPM | TEMPERATURE: 98 F | OXYGEN SATURATION: 96 % | HEIGHT: 72 IN | SYSTOLIC BLOOD PRESSURE: 146 MMHG | RESPIRATION RATE: 18 BRPM | WEIGHT: 293 LBS | DIASTOLIC BLOOD PRESSURE: 99 MMHG

## 2023-03-02 DIAGNOSIS — D50.0 IRON DEFICIENCY ANEMIA DUE TO CHRONIC BLOOD LOSS: Primary | ICD-10-CM

## 2023-03-02 DIAGNOSIS — D50.0 IRON DEFICIENCY ANEMIA DUE TO CHRONIC BLOOD LOSS: ICD-10-CM

## 2023-03-02 LAB
ALBUMIN SERPL-MCNC: 3.6 G/DL (ref 3.5–5)
ALBUMIN/GLOB SERPL: 0.9 RATIO (ref 1.1–2)
ALP SERPL-CCNC: 85 UNIT/L (ref 40–150)
ALT SERPL-CCNC: 11 UNIT/L (ref 0–55)
AST SERPL-CCNC: 12 UNIT/L (ref 5–34)
BASOPHILS # BLD AUTO: 0.06 X10(3)/MCL (ref 0–0.2)
BASOPHILS NFR BLD AUTO: 1.1 %
BILIRUBIN DIRECT+TOT PNL SERPL-MCNC: 1 MG/DL
BUN SERPL-MCNC: 10 MG/DL (ref 7–18.7)
CALCIUM SERPL-MCNC: 9.2 MG/DL (ref 8.4–10.2)
CHLORIDE SERPL-SCNC: 106 MMOL/L (ref 98–107)
CO2 SERPL-SCNC: 24 MMOL/L (ref 22–29)
CREAT SERPL-MCNC: 0.8 MG/DL (ref 0.55–1.02)
EOSINOPHIL # BLD AUTO: 0.16 X10(3)/MCL (ref 0–0.9)
EOSINOPHIL NFR BLD AUTO: 2.8 %
ERYTHROCYTE [DISTWIDTH] IN BLOOD BY AUTOMATED COUNT: 14.8 % (ref 11.5–17)
FERRITIN SERPL-MCNC: 127.85 NG/ML (ref 4.63–204)
GFR SERPLBLD CREATININE-BSD FMLA CKD-EPI: >60 MLS/MIN/1.73/M2
GLOBULIN SER-MCNC: 3.8 GM/DL (ref 2.4–3.5)
GLUCOSE SERPL-MCNC: 102 MG/DL (ref 74–100)
HCT VFR BLD AUTO: 41.7 % (ref 37–47)
HGB BLD-MCNC: 13 G/DL (ref 12–16)
IMM GRANULOCYTES # BLD AUTO: 0.01 X10(3)/MCL (ref 0–0.04)
IMM GRANULOCYTES NFR BLD AUTO: 0.2 %
IRON SATN MFR SERPL: 30 % (ref 20–50)
IRON SERPL-MCNC: 93 UG/DL (ref 50–170)
LYMPHOCYTES # BLD AUTO: 2.14 X10(3)/MCL (ref 0.6–4.6)
LYMPHOCYTES NFR BLD AUTO: 37.7 %
MCH RBC QN AUTO: 28.3 PG
MCHC RBC AUTO-ENTMCNC: 31.2 G/DL (ref 33–36)
MCV RBC AUTO: 90.8 FL (ref 80–94)
MONOCYTES # BLD AUTO: 0.47 X10(3)/MCL (ref 0.1–1.3)
MONOCYTES NFR BLD AUTO: 8.3 %
NEUTROPHILS # BLD AUTO: 2.83 X10(3)/MCL (ref 2.1–9.2)
NEUTROPHILS NFR BLD AUTO: 49.9 %
PLATELET # BLD AUTO: 274 X10(3)/MCL (ref 130–400)
PMV BLD AUTO: 12 FL (ref 7.4–10.4)
POTASSIUM SERPL-SCNC: 3.9 MMOL/L (ref 3.5–5.1)
PROT SERPL-MCNC: 7.4 GM/DL (ref 6.4–8.3)
RBC # BLD AUTO: 4.59 X10(6)/MCL (ref 4.2–5.4)
SODIUM SERPL-SCNC: 139 MMOL/L (ref 136–145)
TIBC SERPL-MCNC: 222 UG/DL (ref 70–310)
TIBC SERPL-MCNC: 315 UG/DL (ref 250–450)
WBC # SPEC AUTO: 5.7 X10(3)/MCL (ref 4.5–11.5)

## 2023-03-02 PROCEDURE — 36415 COLL VENOUS BLD VENIPUNCTURE: CPT

## 2023-03-02 PROCEDURE — 99215 PR OFFICE/OUTPT VISIT, EST, LEVL V, 40-54 MIN: ICD-10-PCS | Mod: S$GLB,,, | Performed by: STUDENT IN AN ORGANIZED HEALTH CARE EDUCATION/TRAINING PROGRAM

## 2023-03-02 PROCEDURE — 3008F PR BODY MASS INDEX (BMI) DOCUMENTED: ICD-10-PCS | Mod: CPTII,S$GLB,, | Performed by: STUDENT IN AN ORGANIZED HEALTH CARE EDUCATION/TRAINING PROGRAM

## 2023-03-02 PROCEDURE — 85025 COMPLETE CBC W/AUTO DIFF WBC: CPT

## 2023-03-02 PROCEDURE — 99215 OFFICE O/P EST HI 40 MIN: CPT | Mod: S$GLB,,, | Performed by: STUDENT IN AN ORGANIZED HEALTH CARE EDUCATION/TRAINING PROGRAM

## 2023-03-02 PROCEDURE — 3077F PR MOST RECENT SYSTOLIC BLOOD PRESSURE >= 140 MM HG: ICD-10-PCS | Mod: CPTII,S$GLB,, | Performed by: STUDENT IN AN ORGANIZED HEALTH CARE EDUCATION/TRAINING PROGRAM

## 2023-03-02 PROCEDURE — 99999 PR PBB SHADOW E&M-EST. PATIENT-LVL IV: CPT | Mod: PBBFAC,,, | Performed by: STUDENT IN AN ORGANIZED HEALTH CARE EDUCATION/TRAINING PROGRAM

## 2023-03-02 PROCEDURE — 83550 IRON BINDING TEST: CPT

## 2023-03-02 PROCEDURE — 3080F DIAST BP >= 90 MM HG: CPT | Mod: CPTII,S$GLB,, | Performed by: STUDENT IN AN ORGANIZED HEALTH CARE EDUCATION/TRAINING PROGRAM

## 2023-03-02 PROCEDURE — 3077F SYST BP >= 140 MM HG: CPT | Mod: CPTII,S$GLB,, | Performed by: STUDENT IN AN ORGANIZED HEALTH CARE EDUCATION/TRAINING PROGRAM

## 2023-03-02 PROCEDURE — 1159F MED LIST DOCD IN RCRD: CPT | Mod: CPTII,S$GLB,, | Performed by: STUDENT IN AN ORGANIZED HEALTH CARE EDUCATION/TRAINING PROGRAM

## 2023-03-02 PROCEDURE — 80053 COMPREHEN METABOLIC PANEL: CPT

## 2023-03-02 PROCEDURE — 99999 PR PBB SHADOW E&M-EST. PATIENT-LVL IV: ICD-10-PCS | Mod: PBBFAC,,, | Performed by: STUDENT IN AN ORGANIZED HEALTH CARE EDUCATION/TRAINING PROGRAM

## 2023-03-02 PROCEDURE — 82728 ASSAY OF FERRITIN: CPT

## 2023-03-02 PROCEDURE — 1160F RVW MEDS BY RX/DR IN RCRD: CPT | Mod: CPTII,S$GLB,, | Performed by: STUDENT IN AN ORGANIZED HEALTH CARE EDUCATION/TRAINING PROGRAM

## 2023-03-02 PROCEDURE — 3008F BODY MASS INDEX DOCD: CPT | Mod: CPTII,S$GLB,, | Performed by: STUDENT IN AN ORGANIZED HEALTH CARE EDUCATION/TRAINING PROGRAM

## 2023-03-02 PROCEDURE — 3080F PR MOST RECENT DIASTOLIC BLOOD PRESSURE >= 90 MM HG: ICD-10-PCS | Mod: CPTII,S$GLB,, | Performed by: STUDENT IN AN ORGANIZED HEALTH CARE EDUCATION/TRAINING PROGRAM

## 2023-03-02 PROCEDURE — 1160F PR REVIEW ALL MEDS BY PRESCRIBER/CLIN PHARMACIST DOCUMENTED: ICD-10-PCS | Mod: CPTII,S$GLB,, | Performed by: STUDENT IN AN ORGANIZED HEALTH CARE EDUCATION/TRAINING PROGRAM

## 2023-03-02 PROCEDURE — 1159F PR MEDICATION LIST DOCUMENTED IN MEDICAL RECORD: ICD-10-PCS | Mod: CPTII,S$GLB,, | Performed by: STUDENT IN AN ORGANIZED HEALTH CARE EDUCATION/TRAINING PROGRAM

## 2023-03-02 NOTE — PROGRESS NOTES
[facility] Hematology Consult Note    Referring provider: No ref. provider found    Subjective:       Patient ID: Orin Carcamo is a 33 y.o. female.    Chief Complaint: IRON DEFICIENCY ANEMIA (Pt reports always being cold since starting the iv iron.)    Ms. Carcamo is a 33 y.o. year old AA female who was referred to our service by Dr. Sutherland  for anemia.     She has 2 kids and her cycles became heavier since the last child. She admits prior to September her menstrual cycle continued for 2 months straight. The bleeding was heavy with large clots and she would use 1 pack of pads per day. She was given a Depo shot 9/9/22 which stopped the bleeding, but she had another cycle the week of 10/10/22 which was light per the patient. She had been taking oral iron 1 pill daily which caused her stomach upset and chills. She complained of shortness of breath, cold intolerance, and severe fatigue. She ate 2 cups of ice a day.     Interval History:     Patient here for follow-up of her GABRIELA. She had 5 doses of venofer throughout December 2022 and 1 dose of IV Dextran at the end of January 2023.   Her labs look great today and she is no longer anemia   She denies any chest pain, palpitation, shortness of breath.   Had hysteroscopy and IUD placed 1/24/23 but is still have some vaginal bleeding.   Patient reports feeling cold all the time now, states she used to feel hot now she is cold. Denies any blood in the urine or bowel movements.    Past Medical History:   Diagnosis Date    Anemia, unspecified     HTN (hypertension)     Iron deficiency anemia due to chronic blood loss 10/18/2022      Review of patient's allergies indicates:  No Known Allergies   Current Outpatient Medications on File Prior to Visit   Medication Sig Dispense Refill    ibuprofen (ADVIL,MOTRIN) 600 MG tablet Take 1 tablet (600 mg total) by mouth every 6 (six) hours as needed for Pain. 120 tablet 1     Current Facility-Administered Medications on File Prior to Visit    Medication Dose Route Frequency Provider Last Rate Last Admin    0.9%  NaCl infusion   Intravenous Continuous Jd Edgar MD        alteplase injection 2 mg  2 mg Intra-Catheter PRN Susan Farley MD        dextrose 5 % and 0.45 % NaCl infusion   Intravenous Continuous Jd Edgar MD        heparin, porcine (PF) 100 unit/mL injection flush 500 Units  500 Units Intravenous PRN Susan Farley MD        lactated ringers infusion   Intravenous Continuous Jd Edgar  mL/hr at 01/24/23 1032 New Bag at 01/24/23 1032    sodium chloride 0.9% 250 mL flush bag   Intravenous 1 time in Clinic/HOD Susan Farley MD        sodium chloride 0.9% flush 10 mL  10 mL Intravenous PRN Susan Farley MD          Social History     Socioeconomic History    Marital status: Single   Tobacco Use    Smoking status: Never    Smokeless tobacco: Never   Substance and Sexual Activity    Alcohol use: Yes     Comment: OCCASIONAL    Drug use: Never     Family History   Problem Relation Age of Onset    Diabetes Mother          Review of Systems  All 12 review of systems negative except as mentioned in HPI.     Physical Exam  Gen: Well appearing, appears stated age, in no acute distress  CV: RRR, no murmurs / thrills auscultated  Pulm: CTA bilaterally, normal respiratory effort  GI: Nondistended, nontender to palpation  Skin: No rashes noted, warm to touch  Extremities: No edema noted.   MSK: Normal ROM.   Psych: Normal affect, normal speech and thought content  Neuro: AAO x 4, no focal deficits.       No visits with results within 2 Week(s) from this visit.   Latest known visit with results is:   Admission on 01/24/2023, Discharged on 01/24/2023   Component Date Value    Beta hCG Qualitative, Ur* 01/24/2023 Negative     ABORH Retype 01/24/2023 O POS         US Pelvis Complete Non OB  Narrative: EXAMINATION:  ULTRASOUND PELVIS NON OB COMPLETE:    CLINICAL HISTORY:  Other iron deficiency  anemias,    COMPARISON:  09/14/2016    FINDINGS:  Transabdominal  scanning was performed. Multiple sonographic images reveal the uterus to be prominent size measuring 4.3 x 5.4 x 6.3 cm..  The right ovary measures 2.8 x 1.5 x 3.0 cm and demonstrates venous flow.  The left ovary measures 3.2 x 2.1 x 2.1 cm and demonstrates venous flow.   The endometrium thickened measuring 2.6 cm.  No free fluid is seen within the cul-de-sac. No abnormal adnexal mass is seen.  Impression: 1. Prominent uterus with thickened endometrium measuring 2.6 cm.  Clinical correlation is indicated    Electronically signed by: Fermin Moncada  Date:    09/26/2022  Time:    14:07        Assessment:     GABRIELA:  - Hgb improved from 6.0-->8.6 s/p 2u PRBC   - Treated with IV Venofer x 5 doses (11/2022)  - Treated with Iron Dextran x 1 dose 1/25/23  - discontinued PO iron due to her poor tolerance  - CBC now normal. Has IUD with minimal bleeding now    Plan:     - RTC in 3 months with NP with labs, if labs stable can RTC 6 months after that  - cbc, cmp, iron panel, ferritin- 1 hr prior @ [facility]    I spent a total of 40 minutes on the day of the visit.This includes face to face time and non-face to face time preparing to see the patient (eg, review of tests), obtaining and/or reviewing separately obtained history, documenting clinical information in the electronic or other health record, independently interpreting results and communicating results to the patient/family/caregiver, or care coordinator.      Elizabeth LeJeune, MD  Hematology/Oncology

## 2023-03-28 NOTE — PROGRESS NOTES
HPI:   33 y.o. F s/p Hyst D&C with Mirena IUD insertion on 1/24/2023  due to AUB here for postop visit. Doing well.  No c/o.     Pathology:   Endometrium:  Fragmented proliferative endometrium,  benign   Lower uterine segment, benign   Endocervical glands, benign   Squamous epithelium, benign   Endocervix:  Benign endocervical glands  Lower uterine segment endometrium, benign     Physical Exam:   /80 (BP Location: Left arm, Patient Position: Sitting)   Ht 6' (1.829 m)   Wt (!) 151.5 kg (334 lb)   BMI 45.30 kg/m²   Gen: NAD  Abd: Soft, NT.  Wounds healing well.  No sign of infection.  Pelvis:   - Vulva: Normal   - Perianal skin: Normal   - Urethra: Normal meatus. Q-tip: Not performed   - Vagina: NormalVaginal discharge present: . Cystocele: Absent. Rectocele: Absent   - Cervix: Normal. Cervical motion tenderness Absent . IUD string in place   - Uterus: normal. Mobile. Non-tender.   - Adnexal: Normal      Assessment:   1. Iron deficiency anemia due to chronic blood loss    2. IUD check up    Hysteroscopy, With Dilation And Curettage Of Uterus and Insertion, Intrauterine Device       Plan:   Reviewed pathology  May return to normal activity  RTC PRN/annual

## 2023-03-29 ENCOUNTER — OFFICE VISIT (OUTPATIENT)
Dept: OBSTETRICS AND GYNECOLOGY | Facility: CLINIC | Age: 33
End: 2023-03-29
Payer: COMMERCIAL

## 2023-03-29 VITALS
SYSTOLIC BLOOD PRESSURE: 124 MMHG | HEIGHT: 72 IN | BODY MASS INDEX: 39.68 KG/M2 | WEIGHT: 293 LBS | DIASTOLIC BLOOD PRESSURE: 80 MMHG

## 2023-03-29 DIAGNOSIS — Z30.431 IUD CHECK UP: ICD-10-CM

## 2023-03-29 DIAGNOSIS — D50.0 IRON DEFICIENCY ANEMIA DUE TO CHRONIC BLOOD LOSS: Primary | ICD-10-CM

## 2023-03-29 PROCEDURE — 99213 OFFICE O/P EST LOW 20 MIN: CPT | Mod: ,,, | Performed by: OBSTETRICS & GYNECOLOGY

## 2023-03-29 PROCEDURE — 3074F PR MOST RECENT SYSTOLIC BLOOD PRESSURE < 130 MM HG: ICD-10-PCS | Mod: CPTII,,, | Performed by: OBSTETRICS & GYNECOLOGY

## 2023-03-29 PROCEDURE — 3008F PR BODY MASS INDEX (BMI) DOCUMENTED: ICD-10-PCS | Mod: CPTII,,, | Performed by: OBSTETRICS & GYNECOLOGY

## 2023-03-29 PROCEDURE — 99213 PR OFFICE/OUTPT VISIT, EST, LEVL III, 20-29 MIN: ICD-10-PCS | Mod: ,,, | Performed by: OBSTETRICS & GYNECOLOGY

## 2023-03-29 PROCEDURE — 1159F PR MEDICATION LIST DOCUMENTED IN MEDICAL RECORD: ICD-10-PCS | Mod: CPTII,,, | Performed by: OBSTETRICS & GYNECOLOGY

## 2023-03-29 PROCEDURE — 3074F SYST BP LT 130 MM HG: CPT | Mod: CPTII,,, | Performed by: OBSTETRICS & GYNECOLOGY

## 2023-03-29 PROCEDURE — 3008F BODY MASS INDEX DOCD: CPT | Mod: CPTII,,, | Performed by: OBSTETRICS & GYNECOLOGY

## 2023-03-29 PROCEDURE — 3079F PR MOST RECENT DIASTOLIC BLOOD PRESSURE 80-89 MM HG: ICD-10-PCS | Mod: CPTII,,, | Performed by: OBSTETRICS & GYNECOLOGY

## 2023-03-29 PROCEDURE — 1159F MED LIST DOCD IN RCRD: CPT | Mod: CPTII,,, | Performed by: OBSTETRICS & GYNECOLOGY

## 2023-03-29 PROCEDURE — 3079F DIAST BP 80-89 MM HG: CPT | Mod: CPTII,,, | Performed by: OBSTETRICS & GYNECOLOGY

## 2023-06-02 ENCOUNTER — OFFICE VISIT (OUTPATIENT)
Dept: HEMATOLOGY/ONCOLOGY | Facility: CLINIC | Age: 33
End: 2023-06-02
Payer: COMMERCIAL

## 2023-06-02 VITALS
BODY MASS INDEX: 39.68 KG/M2 | HEART RATE: 77 BPM | HEIGHT: 72 IN | SYSTOLIC BLOOD PRESSURE: 151 MMHG | RESPIRATION RATE: 18 BRPM | WEIGHT: 293 LBS | TEMPERATURE: 98 F | OXYGEN SATURATION: 99 % | DIASTOLIC BLOOD PRESSURE: 102 MMHG

## 2023-06-02 DIAGNOSIS — D50.0 IRON DEFICIENCY ANEMIA DUE TO CHRONIC BLOOD LOSS: Primary | ICD-10-CM

## 2023-06-02 DIAGNOSIS — D50.9 MICROCYTIC ANEMIA: ICD-10-CM

## 2023-06-02 DIAGNOSIS — D50.0 ANEMIA DUE TO CHRONIC BLOOD LOSS: ICD-10-CM

## 2023-06-02 PROCEDURE — 3077F PR MOST RECENT SYSTOLIC BLOOD PRESSURE >= 140 MM HG: ICD-10-PCS | Mod: CPTII,S$GLB,,

## 2023-06-02 PROCEDURE — 3008F BODY MASS INDEX DOCD: CPT | Mod: CPTII,S$GLB,,

## 2023-06-02 PROCEDURE — 3080F PR MOST RECENT DIASTOLIC BLOOD PRESSURE >= 90 MM HG: ICD-10-PCS | Mod: CPTII,S$GLB,,

## 2023-06-02 PROCEDURE — 1159F PR MEDICATION LIST DOCUMENTED IN MEDICAL RECORD: ICD-10-PCS | Mod: CPTII,S$GLB,,

## 2023-06-02 PROCEDURE — 99999 PR PBB SHADOW E&M-EST. PATIENT-LVL IV: ICD-10-PCS | Mod: PBBFAC,,,

## 2023-06-02 PROCEDURE — 3077F SYST BP >= 140 MM HG: CPT | Mod: CPTII,S$GLB,,

## 2023-06-02 PROCEDURE — 99214 OFFICE O/P EST MOD 30 MIN: CPT | Mod: S$GLB,,,

## 2023-06-02 PROCEDURE — 1159F MED LIST DOCD IN RCRD: CPT | Mod: CPTII,S$GLB,,

## 2023-06-02 PROCEDURE — 3008F PR BODY MASS INDEX (BMI) DOCUMENTED: ICD-10-PCS | Mod: CPTII,S$GLB,,

## 2023-06-02 PROCEDURE — 3080F DIAST BP >= 90 MM HG: CPT | Mod: CPTII,S$GLB,,

## 2023-06-02 PROCEDURE — 1160F RVW MEDS BY RX/DR IN RCRD: CPT | Mod: CPTII,S$GLB,,

## 2023-06-02 PROCEDURE — 99999 PR PBB SHADOW E&M-EST. PATIENT-LVL IV: CPT | Mod: PBBFAC,,,

## 2023-06-02 PROCEDURE — 1160F PR REVIEW ALL MEDS BY PRESCRIBER/CLIN PHARMACIST DOCUMENTED: ICD-10-PCS | Mod: CPTII,S$GLB,,

## 2023-06-02 PROCEDURE — 99214 PR OFFICE/OUTPT VISIT, EST, LEVL IV, 30-39 MIN: ICD-10-PCS | Mod: S$GLB,,,

## 2023-06-02 RX ORDER — AMLODIPINE BESYLATE 10 MG/1
10 TABLET ORAL DAILY
COMMUNITY

## 2023-06-02 NOTE — PROGRESS NOTES
[facility] Hematology Consult Note    Referring provider: No ref. provider found    Subjective:       Patient ID: Orin Carcamo is a 33 y.o. female.    Chief Complaint: Iron Deficiency Anemia (Pt reports having severe headaches last 2-3 weeks )    Treatment Hx:  Venofer x 5 doses (12/22)  IV Dextran x 1 dose (1/2023)    Ms. Carcamo is a 33 y.o. year old AA female who was referred to our service by Dr. Sutherland  for anemia.     She has 2 kids and her cycles became heavier since the last child. She admits prior to September her menstrual cycle continued for 2 months straight. The bleeding was heavy with large clots and she would use 1 pack of pads per day. She was given a Depo shot 9/9/22 which stopped the bleeding, but she had another cycle the week of 10/10/22 which was light per the patient. She had been taking oral iron 1 pill daily which caused her stomach upset and chills. She complained of shortness of breath, cold intolerance, and severe fatigue. She ate 2 cups of ice a day.     Interval History:   She returns to the clinic today for a three month follow-up regarding her iron deficiency anemia. She reports feeling well, without any concerns or complaints today. She continues to have an IUD in place, now with minimal vaginal bleeding. She denies any heavy vaginal bleeding, blood in stool, N/V/D/C, abdominal pain, or PICA symptoms.     Past Medical History:   Diagnosis Date    Anemia, unspecified     HTN (hypertension)     Iron deficiency anemia due to chronic blood loss 10/18/2022      Review of patient's allergies indicates:  No Known Allergies   Current Outpatient Medications on File Prior to Visit   Medication Sig Dispense Refill    amLODIPine (NORVASC) 10 MG tablet Take 10 mg by mouth once daily.      ibuprofen (ADVIL,MOTRIN) 600 MG tablet Take 1 tablet (600 mg total) by mouth every 6 (six) hours as needed for Pain. 120 tablet 1    levonorgestreL (MIRENA) 21 mcg/24 hours (8 yrs) 52 mg IUD 1 each by  Intrauterine route once.       Current Facility-Administered Medications on File Prior to Visit   Medication Dose Route Frequency Provider Last Rate Last Admin    0.9%  NaCl infusion   Intravenous Continuous Jd Edgar MD        alteplase injection 2 mg  2 mg Intra-Catheter PRN Susan Farley MD        dextrose 5 % and 0.45 % NaCl infusion   Intravenous Continuous Jd Edgar MD        heparin, porcine (PF) 100 unit/mL injection flush 500 Units  500 Units Intravenous PRN Susan Farley MD        lactated ringers infusion   Intravenous Continuous Jd Edgar  mL/hr at 01/24/23 1032 New Bag at 01/24/23 1032    sodium chloride 0.9% 250 mL flush bag   Intravenous 1 time in Clinic/HOD Susan Farley MD        sodium chloride 0.9% flush 10 mL  10 mL Intravenous PRN Susan Farley MD          Social History     Socioeconomic History    Marital status: Single   Tobacco Use    Smoking status: Never    Smokeless tobacco: Never   Substance and Sexual Activity    Alcohol use: Yes     Comment: OCCASIONAL    Drug use: Never    Sexual activity: Yes     Partners: Male     Birth control/protection: I.U.D.     Family History   Problem Relation Age of Onset    Diabetes Mother          Review of Systems  Review of Systems   Constitutional:  Negative for appetite change, chills, fatigue, fever and unexpected weight change.   HENT:  Negative for facial swelling, mouth sores, nosebleeds, sinus pressure/congestion and sore throat.    Eyes:  Negative for photophobia and pain.   Respiratory:  Negative for cough, chest tightness, shortness of breath and wheezing.    Cardiovascular:  Negative for chest pain, palpitations and leg swelling.   Gastrointestinal:  Negative for abdominal pain, blood in stool, change in bowel habit, constipation, diarrhea, nausea, vomiting and change in bowel habit.   Endocrine: Negative.    Genitourinary:  Negative for dysuria, frequency, hematuria, hot flashes, pelvic pain, urgency and vaginal pain.    Musculoskeletal:  Negative for arthralgias, gait problem, joint swelling and myalgias.   Integumentary:  Negative for pallor, rash, wound, breast mass, breast discharge and breast tenderness.   Allergic/Immunologic: Negative.  Negative for immunocompromised state.   Neurological:  Negative for dizziness, vertigo, syncope, weakness and numbness.   Hematological:  Negative for adenopathy. Does not bruise/bleed easily.   Psychiatric/Behavioral:  Negative for behavioral problems and dysphoric mood. The patient is not nervous/anxious.    All other systems reviewed and are negative.  Breast: Negative for mass and tenderness     Physical Exam  Physical Exam  Vitals reviewed.   Constitutional:       General: She is not in acute distress.     Appearance: Normal appearance. She is normal weight.   HENT:      Head: Normocephalic and atraumatic.      Nose: Nose normal. No congestion or rhinorrhea.      Mouth/Throat:      Mouth: Mucous membranes are moist.      Pharynx: Oropharynx is clear. No oropharyngeal exudate or posterior oropharyngeal erythema.   Eyes:      Extraocular Movements: Extraocular movements intact.      Conjunctiva/sclera: Conjunctivae normal.      Pupils: Pupils are equal, round, and reactive to light.   Cardiovascular:      Rate and Rhythm: Normal rate and regular rhythm.      Pulses: Normal pulses.      Heart sounds: Normal heart sounds. No murmur heard.    No friction rub. No gallop.   Pulmonary:      Effort: Pulmonary effort is normal. No respiratory distress.      Breath sounds: Normal breath sounds. No wheezing, rhonchi or rales.   Abdominal:      General: Abdomen is flat. Bowel sounds are normal.      Palpations: Abdomen is soft. There is no mass.      Tenderness: There is no abdominal tenderness. There is no guarding.   Musculoskeletal:         General: No swelling, tenderness or signs of injury. Normal range of motion.      Cervical back: Normal range of motion and neck supple.      Right lower leg:  No edema.      Left lower leg: No edema.   Lymphadenopathy:      Cervical: No cervical adenopathy.   Skin:     General: Skin is warm and dry.      Findings: No erythema, lesion or rash.   Neurological:      General: No focal deficit present.      Mental Status: She is alert and oriented to person, place, and time. Mental status is at baseline.      Cranial Nerves: No cranial nerve deficit.      Motor: No weakness.      Gait: Gait normal.   Psychiatric:         Mood and Affect: Mood normal.         Behavior: Behavior normal.         Thought Content: Thought content normal.         Judgment: Judgment normal.          Lab Visit on 06/02/2023   Component Date Value    Iron Binding Capacity Un* 06/02/2023 231     Iron Level 06/02/2023 79     Transferrin 06/02/2023 0 (L)     Iron Binding Capacity To* 06/02/2023 310     Iron Saturation 06/02/2023 25     Sodium Level 06/02/2023 140     Potassium Level 06/02/2023 4.0     Chloride 06/02/2023 105     Carbon Dioxide 06/02/2023 28     Glucose Level 06/02/2023 102 (H)     Blood Urea Nitrogen 06/02/2023 9.0     Creatinine 06/02/2023 0.80     Calcium Level Total 06/02/2023 9.0     Protein Total 06/02/2023 7.1     Albumin Level 06/02/2023 3.4 (L)     Globulin 06/02/2023 3.7 (H)     Albumin/Globulin Ratio 06/02/2023 0.9 (L)     Bilirubin Total 06/02/2023 0.7     Alkaline Phosphatase 06/02/2023 89     Alanine Aminotransferase 06/02/2023 9     Aspartate Aminotransfera* 06/02/2023 12     eGFR 06/02/2023 >60     Ferritin Level 06/02/2023 61.59     WBC 06/02/2023 6.02     RBC 06/02/2023 4.40     Hgb 06/02/2023 13.3     Hct 06/02/2023 42.3     MCV 06/02/2023 96.1 (H)     MCH 06/02/2023 30.2     MCHC 06/02/2023 31.4 (L)     RDW 06/02/2023 12.8     Platelet 06/02/2023 275     MPV 06/02/2023 12.4 (H)     Neut % 06/02/2023 44.7     Lymph % 06/02/2023 43.2     Mono % 06/02/2023 6.5     Eos % 06/02/2023 3.8     Basophil % 06/02/2023 1.5     Lymph # 06/02/2023 2.60     Neut # 06/02/2023 2.69      Mono # 06/02/2023 0.39     Eos # 06/02/2023 0.23     Baso # 06/02/2023 0.09     IG# 06/02/2023 0.02     IG% 06/02/2023 0.3         US Pelvis Complete Non OB  STUDY: Ultrasound pelvis  (transabdominal and transvaginal)         [id number] 10:34 AM 10/18/2022:    OP    CLINICAL HX; MENORRHAGIA FOR 3-4 MONTHS    PMH:NONE    TECH;2D TRANSABD AND TRANSVAG US OF PELVIS WITH COLOR FLOW    US TECH;PLP                COMPARISON: none            FINDINGS:         UTERUS: Uterus is mildly to moderately enlarged measuring 11.3 x 6.2 x 5.7 cm.  The myometrium has a mildly to moderately heterogeneous echogenic appearance with no worrisome focal masses appreciated.  Endometrial stripe is thickened measuring 1.6 cm in   AP thickness on transvaginal imaging.        OVARIES: The right ovary measures 3.4 x 2.0 x 2.2 cm and the left ovary measures 3.7 x 2.2 x 2.5 cm with benign-appearing follicles and small (less than 1.5 cm in greatest diameter) simple cysts noted originating from both ovaries.  No worrisome adnexal   abnormalities are appreciated.        MISCELLANEOUS: A small amount of free fluid is noted within the cul-de-sac.  There was no significant tenderness while scanning over the pelvis.            IMPRESSION:        1.  The uterus is mildly to moderately enlarged with the myometrium having a heterogeneous echogenic appearance and thickening of the endometrial stripe which measures 1.6 cm in AP thickness on transvaginal imaging.        2.  Benign-appearing follicles and small (less then 1.5 cm in greatest diameter), benign-appearing, simple cysts are noted originating from both ovaries.        3.  A small amount of free fluid is noted within the cul-de-sac with no significant tenderness while scanning over the pelvis.        Assessment:     GABRIELA:  - Hgb improved from 6.0-->8.6 s/p 2u PRBC   - Treated with IV Venofer x 5 doses (11/2022)  - Treated with Iron Dextran x 1 dose 1/25/23  - discontinued PO iron due to her poor  tolerance  - CBC now normal. Has IUD with minimal bleeding now    Plan:   Labs overall stable.  Continue to monitor ferritin as it has declined.  RTC in three months with NP for FU/lab      ALEXANDRE Sawyer  Oncology/Hematology   [facility] Center [facility]

## 2023-06-06 DIAGNOSIS — D50.0 IRON DEFICIENCY ANEMIA DUE TO CHRONIC BLOOD LOSS: Primary | ICD-10-CM

## 2023-06-06 RX ORDER — FOLIC ACID 1 MG/1
1 TABLET ORAL DAILY
Qty: 30 TABLET | Refills: 11 | Status: SHIPPED | OUTPATIENT
Start: 2023-06-06 | End: 2023-10-04 | Stop reason: SDUPTHER

## 2023-10-03 ENCOUNTER — LAB VISIT (OUTPATIENT)
Dept: LAB | Facility: HOSPITAL | Age: 33
End: 2023-10-03
Payer: COMMERCIAL

## 2023-10-03 DIAGNOSIS — D50.0 IRON DEFICIENCY ANEMIA DUE TO CHRONIC BLOOD LOSS: ICD-10-CM

## 2023-10-03 DIAGNOSIS — D50.9 MICROCYTIC ANEMIA: ICD-10-CM

## 2023-10-03 DIAGNOSIS — D50.0 ANEMIA DUE TO CHRONIC BLOOD LOSS: ICD-10-CM

## 2023-10-03 LAB
ALBUMIN SERPL-MCNC: 3.5 G/DL (ref 3.5–5)
ALBUMIN/GLOB SERPL: 0.9 RATIO (ref 1.1–2)
ALP SERPL-CCNC: 96 UNIT/L (ref 40–150)
ALT SERPL-CCNC: 13 UNIT/L (ref 0–55)
AST SERPL-CCNC: 14 UNIT/L (ref 5–34)
BASOPHILS # BLD AUTO: 0.07 X10(3)/MCL
BASOPHILS NFR BLD AUTO: 1.2 %
BILIRUB SERPL-MCNC: 1 MG/DL
BUN SERPL-MCNC: 11 MG/DL (ref 7–18.7)
CALCIUM SERPL-MCNC: 9 MG/DL (ref 8.4–10.2)
CHLORIDE SERPL-SCNC: 103 MMOL/L (ref 98–107)
CO2 SERPL-SCNC: 28 MMOL/L (ref 22–29)
CREAT SERPL-MCNC: 0.83 MG/DL (ref 0.55–1.02)
EOSINOPHIL # BLD AUTO: 0.15 X10(3)/MCL (ref 0–0.9)
EOSINOPHIL NFR BLD AUTO: 2.5 %
ERYTHROCYTE [DISTWIDTH] IN BLOOD BY AUTOMATED COUNT: 12.2 % (ref 11.5–17)
FERRITIN SERPL-MCNC: 75.32 NG/ML (ref 4.63–204)
GFR SERPLBLD CREATININE-BSD FMLA CKD-EPI: >60 MLS/MIN/1.73/M2
GLOBULIN SER-MCNC: 3.8 GM/DL (ref 2.4–3.5)
GLUCOSE SERPL-MCNC: 107 MG/DL (ref 74–100)
HCT VFR BLD AUTO: 40.7 % (ref 37–47)
HGB BLD-MCNC: 13.1 G/DL (ref 12–16)
IMM GRANULOCYTES # BLD AUTO: 0.02 X10(3)/MCL (ref 0–0.04)
IMM GRANULOCYTES NFR BLD AUTO: 0.3 %
IRON SATN MFR SERPL: 26 % (ref 20–50)
IRON SERPL-MCNC: 84 UG/DL (ref 50–170)
LYMPHOCYTES # BLD AUTO: 1.8 X10(3)/MCL (ref 0.6–4.6)
LYMPHOCYTES NFR BLD AUTO: 29.8 %
MAGNESIUM SERPL-MCNC: 1.7 MG/DL (ref 1.6–2.6)
MCH RBC QN AUTO: 30.5 PG (ref 27–31)
MCHC RBC AUTO-ENTMCNC: 32.2 G/DL (ref 33–36)
MCV RBC AUTO: 94.9 FL (ref 80–94)
MONOCYTES # BLD AUTO: 0.45 X10(3)/MCL (ref 0.1–1.3)
MONOCYTES NFR BLD AUTO: 7.5 %
NEUTROPHILS # BLD AUTO: 3.55 X10(3)/MCL (ref 2.1–9.2)
NEUTROPHILS NFR BLD AUTO: 58.7 %
PHOSPHATE SERPL-MCNC: 2 MG/DL (ref 2.3–4.7)
PLATELET # BLD AUTO: 298 X10(3)/MCL (ref 130–400)
PMV BLD AUTO: 12.7 FL (ref 7.4–10.4)
POTASSIUM SERPL-SCNC: 3.5 MMOL/L (ref 3.5–5.1)
PROT SERPL-MCNC: 7.3 GM/DL (ref 6.4–8.3)
RBC # BLD AUTO: 4.29 X10(6)/MCL (ref 4.2–5.4)
SODIUM SERPL-SCNC: 139 MMOL/L (ref 136–145)
TIBC SERPL-MCNC: 239 UG/DL (ref 70–310)
TIBC SERPL-MCNC: 323 UG/DL (ref 250–450)
WBC # SPEC AUTO: 6.04 X10(3)/MCL (ref 4.5–11.5)

## 2023-10-03 PROCEDURE — 36415 COLL VENOUS BLD VENIPUNCTURE: CPT

## 2023-10-03 PROCEDURE — 84100 ASSAY OF PHOSPHORUS: CPT

## 2023-10-03 PROCEDURE — 82728 ASSAY OF FERRITIN: CPT

## 2023-10-03 PROCEDURE — 83735 ASSAY OF MAGNESIUM: CPT

## 2023-10-03 PROCEDURE — 83550 IRON BINDING TEST: CPT

## 2023-10-03 PROCEDURE — 80053 COMPREHEN METABOLIC PANEL: CPT

## 2023-10-03 PROCEDURE — 85025 COMPLETE CBC W/AUTO DIFF WBC: CPT

## 2023-10-04 ENCOUNTER — OFFICE VISIT (OUTPATIENT)
Dept: HEMATOLOGY/ONCOLOGY | Facility: CLINIC | Age: 33
End: 2023-10-04
Payer: COMMERCIAL

## 2023-10-04 VITALS
RESPIRATION RATE: 20 BRPM | HEART RATE: 69 BPM | DIASTOLIC BLOOD PRESSURE: 87 MMHG | TEMPERATURE: 98 F | HEIGHT: 72 IN | BODY MASS INDEX: 39.68 KG/M2 | OXYGEN SATURATION: 95 % | SYSTOLIC BLOOD PRESSURE: 126 MMHG | WEIGHT: 293 LBS

## 2023-10-04 DIAGNOSIS — R51.9 CHRONIC NONINTRACTABLE HEADACHE, UNSPECIFIED HEADACHE TYPE: ICD-10-CM

## 2023-10-04 DIAGNOSIS — D50.0 IRON DEFICIENCY ANEMIA DUE TO CHRONIC BLOOD LOSS: Primary | ICD-10-CM

## 2023-10-04 DIAGNOSIS — D50.0 ANEMIA DUE TO CHRONIC BLOOD LOSS: ICD-10-CM

## 2023-10-04 DIAGNOSIS — G89.29 CHRONIC NONINTRACTABLE HEADACHE, UNSPECIFIED HEADACHE TYPE: ICD-10-CM

## 2023-10-04 DIAGNOSIS — E53.8 FOLATE DEFICIENCY: ICD-10-CM

## 2023-10-04 PROCEDURE — 3008F BODY MASS INDEX DOCD: CPT | Mod: CPTII,S$GLB,,

## 2023-10-04 PROCEDURE — 99215 PR OFFICE/OUTPT VISIT, EST, LEVL V, 40-54 MIN: ICD-10-PCS | Mod: S$GLB,,,

## 2023-10-04 PROCEDURE — 99999 PR PBB SHADOW E&M-EST. PATIENT-LVL IV: ICD-10-PCS | Mod: PBBFAC,,,

## 2023-10-04 PROCEDURE — 1160F RVW MEDS BY RX/DR IN RCRD: CPT | Mod: CPTII,S$GLB,,

## 2023-10-04 PROCEDURE — 99999 PR PBB SHADOW E&M-EST. PATIENT-LVL IV: CPT | Mod: PBBFAC,,,

## 2023-10-04 PROCEDURE — 1160F PR REVIEW ALL MEDS BY PRESCRIBER/CLIN PHARMACIST DOCUMENTED: ICD-10-PCS | Mod: CPTII,S$GLB,,

## 2023-10-04 PROCEDURE — 3079F DIAST BP 80-89 MM HG: CPT | Mod: CPTII,S$GLB,,

## 2023-10-04 PROCEDURE — 3079F PR MOST RECENT DIASTOLIC BLOOD PRESSURE 80-89 MM HG: ICD-10-PCS | Mod: CPTII,S$GLB,,

## 2023-10-04 PROCEDURE — 1159F PR MEDICATION LIST DOCUMENTED IN MEDICAL RECORD: ICD-10-PCS | Mod: CPTII,S$GLB,,

## 2023-10-04 PROCEDURE — 3008F PR BODY MASS INDEX (BMI) DOCUMENTED: ICD-10-PCS | Mod: CPTII,S$GLB,,

## 2023-10-04 PROCEDURE — 1159F MED LIST DOCD IN RCRD: CPT | Mod: CPTII,S$GLB,,

## 2023-10-04 PROCEDURE — 3074F PR MOST RECENT SYSTOLIC BLOOD PRESSURE < 130 MM HG: ICD-10-PCS | Mod: CPTII,S$GLB,,

## 2023-10-04 PROCEDURE — 99215 OFFICE O/P EST HI 40 MIN: CPT | Mod: S$GLB,,,

## 2023-10-04 PROCEDURE — 3074F SYST BP LT 130 MM HG: CPT | Mod: CPTII,S$GLB,,

## 2023-10-04 RX ORDER — FOLIC ACID 1 MG/1
1 TABLET ORAL DAILY
Qty: 30 TABLET | Refills: 11 | Status: SHIPPED | OUTPATIENT
Start: 2023-10-04 | End: 2024-10-03

## 2023-10-04 NOTE — PROGRESS NOTES
[facility] Hematology Consult Note    Referring provider: No ref. provider found    Subjective:       Patient ID: Orin Carcamo is a 33 y.o. female.    Chief Complaint: Iron Deficiency Anemia (F/u with labs-- Patient reports increased fatigue and coldness)    Treatment Hx:  Venofer x 5 doses (12/22)  IV Dextran x 1 dose (1/2023)    Ms. Carcamo is a 33 y.o. year old AA female who was referred to our service by Dr. Sutherland  for anemia.     She has 2 kids and her cycles became heavier since the last child. She admits prior to September her menstrual cycle continued for 2 months straight. The bleeding was heavy with large clots and she would use 1 pack of pads per day. She was given a Depo shot 9/9/22 which stopped the bleeding, but she had another cycle the week of 10/10/22 which was light per the patient. She had been taking oral iron 1 pill daily which caused her stomach upset and chills. She complained of shortness of breath, cold intolerance, and severe fatigue. She ate 2 cups of ice a day.     Interval History:   She returns to the clinic today for a three month follow-up regarding her iron deficiency anemia.  She reports doing well today.  She we will be getting eyeglasses soon and is hoping this will improve her headaches. She continues to have an IUD in place, now with minimal vaginal bleeding. She denies any heavy vaginal bleeding, blood in stool, N/V/D/C, abdominal pain, or PICA symptoms.     Past Medical History:   Diagnosis Date    Anemia, unspecified     HTN (hypertension)     Iron deficiency anemia due to chronic blood loss 10/18/2022      Review of patient's allergies indicates:  No Known Allergies   Current Outpatient Medications on File Prior to Visit   Medication Sig Dispense Refill    amLODIPine (NORVASC) 10 MG tablet Take 10 mg by mouth once daily.      ibuprofen (ADVIL,MOTRIN) 600 MG tablet Take 1 tablet (600 mg total) by mouth every 6 (six) hours as needed for Pain. 120 tablet 1    levonorgestreL  (MIRENA) 21 mcg/24 hours (8 yrs) 52 mg IUD 1 each by Intrauterine route once.      [DISCONTINUED] folic acid (FOLVITE) 1 MG tablet Take 1 tablet (1 mg total) by mouth once daily. 30 tablet 11     Current Facility-Administered Medications on File Prior to Visit   Medication Dose Route Frequency Provider Last Rate Last Admin    0.9%  NaCl infusion   Intravenous Continuous Jd Edgar MD        alteplase injection 2 mg  2 mg Intra-Catheter PRN Susan Farley MD        dextrose 5 % and 0.45 % NaCl infusion   Intravenous Continuous Jd Edgar MD        heparin, porcine (PF) 100 unit/mL injection flush 500 Units  500 Units Intravenous PRN Susan Farley MD        lactated ringers infusion   Intravenous Continuous Jd Edgar  mL/hr at 01/24/23 1032 New Bag at 01/24/23 1032    sodium chloride 0.9% 250 mL flush bag   Intravenous 1 time in Clinic/HOD Susan Farley MD        sodium chloride 0.9% flush 10 mL  10 mL Intravenous PRN Susan Farley MD          Social History     Socioeconomic History    Marital status: Single   Tobacco Use    Smoking status: Never    Smokeless tobacco: Never   Substance and Sexual Activity    Alcohol use: Yes     Comment: OCCASIONAL    Drug use: Never    Sexual activity: Yes     Partners: Male     Birth control/protection: I.U.D.     Family History   Problem Relation Age of Onset    Diabetes Mother          Review of Systems  Review of Systems   Constitutional:  Negative for appetite change, chills, fatigue, fever and unexpected weight change.   HENT:  Negative for facial swelling, mouth sores, nosebleeds, sinus pressure/congestion and sore throat.    Eyes:  Negative for photophobia, pain and visual disturbance.   Respiratory:  Negative for cough, chest tightness, shortness of breath and wheezing.    Cardiovascular:  Negative for chest pain, palpitations and leg swelling.   Gastrointestinal:  Negative for abdominal pain, blood in stool, change in bowel habit, constipation,  diarrhea, nausea and vomiting.   Endocrine: Negative.    Genitourinary:  Negative for dysuria, frequency, hematuria, hot flashes, pelvic pain, urgency and vaginal pain.   Musculoskeletal:  Positive for back pain. Negative for arthralgias, gait problem, joint swelling and myalgias.   Integumentary:  Negative for pallor, rash, wound, breast mass, breast discharge and breast tenderness.   Allergic/Immunologic: Negative.  Negative for immunocompromised state.   Neurological:  Positive for headaches. Negative for dizziness, vertigo, syncope, weakness and numbness.   Hematological:  Negative for adenopathy. Does not bruise/bleed easily.   Psychiatric/Behavioral:  Negative for behavioral problems and dysphoric mood. The patient is not nervous/anxious.    All other systems reviewed and are negative.  Breast: Negative for mass and tenderness       Physical Exam  Physical Exam  Vitals reviewed.   Constitutional:       General: She is not in acute distress.     Appearance: Normal appearance. She is normal weight.   HENT:      Head: Normocephalic and atraumatic.      Nose: Nose normal. No congestion or rhinorrhea.      Mouth/Throat:      Mouth: Mucous membranes are moist.      Pharynx: Oropharynx is clear. No oropharyngeal exudate or posterior oropharyngeal erythema.   Eyes:      Extraocular Movements: Extraocular movements intact.      Conjunctiva/sclera: Conjunctivae normal.      Pupils: Pupils are equal, round, and reactive to light.   Cardiovascular:      Rate and Rhythm: Normal rate and regular rhythm.      Pulses: Normal pulses.      Heart sounds: Normal heart sounds. No murmur heard.     No friction rub. No gallop.   Pulmonary:      Effort: Pulmonary effort is normal. No respiratory distress.      Breath sounds: Normal breath sounds. No wheezing, rhonchi or rales.   Abdominal:      General: Abdomen is flat. Bowel sounds are normal.      Palpations: Abdomen is soft. There is no mass.      Tenderness: There is no abdominal  tenderness. There is no guarding.   Musculoskeletal:         General: No swelling, tenderness or signs of injury. Normal range of motion.      Cervical back: Normal range of motion and neck supple.      Right lower leg: No edema.      Left lower leg: No edema.   Lymphadenopathy:      Cervical: No cervical adenopathy.   Skin:     General: Skin is warm and dry.      Findings: No erythema, lesion or rash.   Neurological:      General: No focal deficit present.      Mental Status: She is alert and oriented to person, place, and time. Mental status is at baseline.      Cranial Nerves: No cranial nerve deficit.      Motor: No weakness.      Gait: Gait normal.   Psychiatric:         Mood and Affect: Mood normal.         Behavior: Behavior normal.         Thought Content: Thought content normal.         Judgment: Judgment normal.            Lab Visit on 10/03/2023   Component Date Value    Sodium Level 10/03/2023 139     Potassium Level 10/03/2023 3.5     Chloride 10/03/2023 103     Carbon Dioxide 10/03/2023 28     Glucose Level 10/03/2023 107 (H)     Blood Urea Nitrogen 10/03/2023 11.0     Creatinine 10/03/2023 0.83     Calcium Level Total 10/03/2023 9.0     Protein Total 10/03/2023 7.3     Albumin Level 10/03/2023 3.5     Globulin 10/03/2023 3.8 (H)     Albumin/Globulin Ratio 10/03/2023 0.9 (L)     Bilirubin Total 10/03/2023 1.0     Alkaline Phosphatase 10/03/2023 96     Alanine Aminotransferase 10/03/2023 13     Aspartate Aminotransfera* 10/03/2023 14     eGFR 10/03/2023 >60     Magnesium Level 10/03/2023 1.70     Phosphorus Level 10/03/2023 2.0 (L)     Iron Binding Capacity Un* 10/03/2023 239     Iron Level 10/03/2023 84     Iron Binding Capacity To* 10/03/2023 323     Iron Saturation 10/03/2023 26     Ferritin Level 10/03/2023 75.32     WBC 10/03/2023 6.04     RBC 10/03/2023 4.29     Hgb 10/03/2023 13.1     Hct 10/03/2023 40.7     MCV 10/03/2023 94.9 (H)     MCH 10/03/2023 30.5     MCHC 10/03/2023 32.2 (L)     RDW  10/03/2023 12.2     Platelet 10/03/2023 298     MPV 10/03/2023 12.7 (H)     Neut % 10/03/2023 58.7     Lymph % 10/03/2023 29.8     Mono % 10/03/2023 7.5     Eos % 10/03/2023 2.5     Basophil % 10/03/2023 1.2     Lymph # 10/03/2023 1.80     Neut # 10/03/2023 3.55     Mono # 10/03/2023 0.45     Eos # 10/03/2023 0.15     Baso # 10/03/2023 0.07     IG# 10/03/2023 0.02     IG% 10/03/2023 0.3         US Pelvis Complete Non OB  STUDY: Ultrasound pelvis  (transabdominal and transvaginal)         [id number] 10:34 AM 10/18/2022:    OP    CLINICAL HX; MENORRHAGIA FOR 3-4 MONTHS    PMH:NONE    TECH;2D TRANSABD AND TRANSVAG US OF PELVIS WITH COLOR FLOW    US TECH;PLP                COMPARISON: none            FINDINGS:         UTERUS: Uterus is mildly to moderately enlarged measuring 11.3 x 6.2 x 5.7 cm.  The myometrium has a mildly to moderately heterogeneous echogenic appearance with no worrisome focal masses appreciated.  Endometrial stripe is thickened measuring 1.6 cm in   AP thickness on transvaginal imaging.        OVARIES: The right ovary measures 3.4 x 2.0 x 2.2 cm and the left ovary measures 3.7 x 2.2 x 2.5 cm with benign-appearing follicles and small (less than 1.5 cm in greatest diameter) simple cysts noted originating from both ovaries.  No worrisome adnexal   abnormalities are appreciated.        MISCELLANEOUS: A small amount of free fluid is noted within the cul-de-sac.  There was no significant tenderness while scanning over the pelvis.            IMPRESSION:        1.  The uterus is mildly to moderately enlarged with the myometrium having a heterogeneous echogenic appearance and thickening of the endometrial stripe which measures 1.6 cm in AP thickness on transvaginal imaging.        2.  Benign-appearing follicles and small (less then 1.5 cm in greatest diameter), benign-appearing, simple cysts are noted originating from both ovaries.        3.  A small amount of free fluid is noted within the cul-de-sac with no  significant tenderness while scanning over the pelvis.        Assessment:     GABRIELA:  Hgb improved from 6.0-->8.6 s/p 2u PRBC   Treated with IV Venofer x 5 doses (11/2022)  Treated with Iron Dextran x 1 dose 1/25/23  discontinued PO iron due to her poor tolerance  CBC and iron studies are normal. Has IUD with minimal bleeding now  Folate deficiency  Continue folic acid daily.  Refill sent  Headaches  Getting eyeglasses soon.  Continue  FU with PCP    Plan:   Labs stable.  Normal iron studies.  Folic acid refill sent to pharmacy.  RTC in 6 months with NP for FU/lab.       GISSEL Sawyer-C  Oncology/Hematology   Cancer Center [facility]

## 2024-02-24 ENCOUNTER — HOSPITAL ENCOUNTER (EMERGENCY)
Facility: HOSPITAL | Age: 34
Discharge: HOME | End: 2024-02-24
Attending: FAMILY MEDICINE
Payer: COMMERCIAL

## 2024-02-24 VITALS
OXYGEN SATURATION: 99 % | TEMPERATURE: 98 F | BODY MASS INDEX: 44.15 KG/M2 | RESPIRATION RATE: 19 BRPM | WEIGHT: 293 LBS | DIASTOLIC BLOOD PRESSURE: 98 MMHG | HEART RATE: 70 BPM | SYSTOLIC BLOOD PRESSURE: 151 MMHG

## 2024-02-24 DIAGNOSIS — G44.209 TENSION HEADACHE: Primary | ICD-10-CM

## 2024-02-24 LAB
FLUAV AG UPPER RESP QL IA.RAPID: NOT DETECTED
FLUBV AG UPPER RESP QL IA.RAPID: NOT DETECTED
SARS-COV-2 RNA RESP QL NAA+PROBE: NOT DETECTED

## 2024-02-24 PROCEDURE — 99284 EMERGENCY DEPT VISIT MOD MDM: CPT | Mod: 25

## 2024-02-24 PROCEDURE — 63600175 PHARM REV CODE 636 W HCPCS: Performed by: FAMILY MEDICINE

## 2024-02-24 PROCEDURE — 0240U COVID/FLU A&B PCR: CPT | Performed by: FAMILY MEDICINE

## 2024-02-24 PROCEDURE — 96372 THER/PROPH/DIAG INJ SC/IM: CPT | Performed by: FAMILY MEDICINE

## 2024-02-24 RX ORDER — METOCLOPRAMIDE HYDROCHLORIDE 5 MG/ML
10 INJECTION INTRAMUSCULAR; INTRAVENOUS
Status: COMPLETED | OUTPATIENT
Start: 2024-02-24 | End: 2024-02-24

## 2024-02-24 RX ORDER — BUTALBITAL, ACETAMINOPHEN AND CAFFEINE 50; 325; 40 MG/1; MG/1; MG/1
1 TABLET ORAL EVERY 4 HOURS PRN
Qty: 30 TABLET | Refills: 0 | Status: SHIPPED | OUTPATIENT
Start: 2024-02-24 | End: 2024-03-05

## 2024-02-24 RX ADMIN — METOCLOPRAMIDE 10 MG: 5 INJECTION, SOLUTION INTRAMUSCULAR; INTRAVENOUS at 12:02

## 2024-02-24 NOTE — ED PROVIDER NOTES
"Encounter Date: 2024       History     Chief Complaint   Patient presents with    Headache     Pt states that she has been having a headache for one week, denies any other symptoms and denies any changes to diet or medication. Cold towel on head is only thing that helped     Patient is a 34-year-old female presents emergency room complaints of a headache.  Reports that she has been having a throbbing bilateral frontal headache that has been ongoing for approximately a week.  She denies any other symptoms.  Denies cough, denies fever chills.  Denies nausea or vomiting.  When asked what makes the headache better, she reports a cool towel.  Last dose of ibuprofen was at 10:00 p.m. (2-1/2 hours prior to arrival).  When asked what makes symptoms worse, patient denies any worsening with bright lights or loud noises, reports the main thing that makes the headaches worse is "bad ass kids".  Patient currently lying in no acute distress.  Patient reports her headache is currently a 6/10 intensity.    The history is provided by the patient.     Review of patient's allergies indicates:  No Known Allergies  Past Medical History:   Diagnosis Date    Anemia, unspecified     HTN (hypertension)     Iron deficiency anemia due to chronic blood loss 10/18/2022     Past Surgical History:   Procedure Laterality Date     SECTION N/A     HYSTEROSCOPY WITH DILATION AND CURETTAGE OF UTERUS N/A 2023    Procedure: HYSTEROSCOPY, WITH DILATION AND CURETTAGE OF UTERUS;  Surgeon: Jd Edgar;  Location: [facility] OR;  Service: OB/GYN;  Laterality: N/A;    INTRAUTERINE DEVICE INSERTION N/A 2023    Procedure: INSERTION, INTRAUTERINE DEVICE;  Surgeon: Jd Egdar;  Location: [facility] OR;  Service: OB/GYN;  Laterality: N/A;     Family History   Problem Relation Age of Onset    Diabetes Mother      Social History     Tobacco Use    Smoking status: Never    Smokeless tobacco: Never   Substance Use Topics    Alcohol use: Yes     Comment: " OCCASIONAL    Drug use: Never     Review of Systems   Constitutional:  Negative for chills, fatigue and fever.   HENT:  Negative for ear pain, rhinorrhea and sore throat.    Eyes:  Negative for photophobia and pain.   Respiratory:  Negative for cough, shortness of breath and wheezing.    Cardiovascular:  Negative for chest pain.   Gastrointestinal:  Negative for abdominal pain, diarrhea, nausea and vomiting.   Genitourinary:  Negative for dysuria.   Neurological:  Positive for headaches. Negative for dizziness and weakness.   All other systems reviewed and are negative.      Physical Exam     Initial Vitals [02/24/24 0012]   BP Pulse Resp Temp SpO2   (!) 154/107 75 18 98.2 °F (36.8 °C) 98 %      MAP       --         Physical Exam    Nursing note and vitals reviewed.  Constitutional: She appears well-developed and well-nourished. No distress.   HENT:   Head: Normocephalic and atraumatic.   Mouth/Throat: Oropharynx is clear and moist.   Eyes: Conjunctivae and EOM are normal. Pupils are equal, round, and reactive to light.   Neck: Neck supple.   Normal range of motion.  Cardiovascular:  Normal rate, regular rhythm, normal heart sounds and intact distal pulses.     Exam reveals no gallop and no friction rub.       No murmur heard.  Pulmonary/Chest: Breath sounds normal. No respiratory distress. She has no wheezes. She has no rhonchi. She has no rales.   Abdominal: Abdomen is soft. Bowel sounds are normal. She exhibits no distension. There is no abdominal tenderness. There is no rebound and no guarding.   Musculoskeletal:         General: Normal range of motion.      Cervical back: Normal range of motion and neck supple.     Neurological: She is alert and oriented to person, place, and time.   Skin: Skin is warm and dry. Capillary refill takes less than 2 seconds. No erythema.   Psychiatric: She has a normal mood and affect. Her behavior is normal. Judgment and thought content normal.         ED Course   Procedures  Labs  Reviewed   COVID/FLU A&B PCR - Normal    Narrative:     The Xpert Xpress SARS-CoV-2/FLU/RSV plus is a rapid, multiplexed real-time PCR test intended for the simultaneous qualitative detection and differentiation of SARS-CoV-2, Influenza A, Influenza B, and respiratory syncytial virus (RSV) viral RNA in either nasopharyngeal swab or nasal swab specimens.                Imaging Results    None          Medications   metoclopramide injection 10 mg (10 mg Intramuscular Given 2/24/24 0032)     Medical Decision Making  Patient is a 34-year-old female presents emergency room with complaints of headaches.  Patient reports that she was mainly here to obtain a COVID test as her friend said when she had headaches, that was her only symptom and was noted to be positive for COVID.  Discussed with patient, will obtain a COVID PCR here in the emergency room.  Will also provide patient with Reglan for symptomatic headache treatment.  Will continue to monitor.    DDX: Common migraine, tension headache, viral syndrome    Risk  Prescription drug management.               ED Course as of 02/24/24 0117   Sat Feb 24, 2024   0114 Influenza A, Molecular: Not Detected [MW]   0114 Influenza B, Molecular: Not Detected [MW]   0114 SARS-CoV2 (COVID-19) Qualitative PCR: Not Detected [MW]   0116 Headache resolved; COVID negative.  Stable for discharge to home.  ER precautions for any acute worsening. [MW]      ED Course User Index  [MW] Guillermo Odonnell MD                           Clinical Impression:  Final diagnoses:  [G44.209] Tension headache (Primary)          ED Disposition Condition    Discharge Stable          ED Prescriptions       Medication Sig Dispense Start Date End Date Auth. Provider    butalbital-acetaminophen-caffeine -40 mg (FIORICET, ESGIC) -40 mg per tablet Take 1 tablet by mouth every 4 (four) hours as needed for Headaches. 30 tablet 2/24/2024 3/5/2024 Guillermo Odonnell MD          Follow-up  Information       Follow up With Specialties Details Why Contact Info    Koko Montana MD Family Medicine   [address]  [facility] [address]  [phone]      [facility] - Emergency Dept Emergency Medicine  As needed, If symptoms worsen [id number]  [facility] [id number] [phone]             Guillermo Odonnell MD  02/24/24 0117

## 2024-03-25 ENCOUNTER — LAB VISIT (OUTPATIENT)
Dept: LAB | Facility: HOSPITAL | Age: 34
End: 2024-03-25
Payer: COMMERCIAL

## 2024-03-25 DIAGNOSIS — D50.0 IRON DEFICIENCY ANEMIA DUE TO CHRONIC BLOOD LOSS: ICD-10-CM

## 2024-03-25 DIAGNOSIS — D50.0 ANEMIA DUE TO CHRONIC BLOOD LOSS: ICD-10-CM

## 2024-03-25 LAB
ALBUMIN SERPL-MCNC: 3.5 G/DL (ref 3.5–5)
ALBUMIN/GLOB SERPL: 0.9 RATIO (ref 1.1–2)
ALP SERPL-CCNC: 89 UNIT/L (ref 40–150)
ALT SERPL-CCNC: 10 UNIT/L (ref 0–55)
AST SERPL-CCNC: 13 UNIT/L (ref 5–34)
BASOPHILS # BLD AUTO: 0.08 X10(3)/MCL
BASOPHILS NFR BLD AUTO: 1.1 %
BILIRUB SERPL-MCNC: 1 MG/DL
BUN SERPL-MCNC: 13 MG/DL (ref 7–18.7)
CALCIUM SERPL-MCNC: 8.7 MG/DL (ref 8.4–10.2)
CHLORIDE SERPL-SCNC: 106 MMOL/L (ref 98–107)
CO2 SERPL-SCNC: 27 MMOL/L (ref 22–29)
CREAT SERPL-MCNC: 0.83 MG/DL (ref 0.55–1.02)
EOSINOPHIL # BLD AUTO: 0.17 X10(3)/MCL (ref 0–0.9)
EOSINOPHIL NFR BLD AUTO: 2.4 %
ERYTHROCYTE [DISTWIDTH] IN BLOOD BY AUTOMATED COUNT: 12.4 % (ref 11.5–17)
FERRITIN SERPL-MCNC: 65.64 NG/ML (ref 4.63–204)
FOLATE SERPL-MCNC: 7.6 NG/ML (ref 7–31.4)
GFR SERPLBLD CREATININE-BSD FMLA CKD-EPI: >60 MLS/MIN/1.73/M2
GLOBULIN SER-MCNC: 3.8 GM/DL (ref 2.4–3.5)
GLUCOSE SERPL-MCNC: 115 MG/DL (ref 74–100)
HCT VFR BLD AUTO: 42.1 % (ref 37–47)
HGB BLD-MCNC: 13.4 G/DL (ref 12–16)
IMM GRANULOCYTES # BLD AUTO: 0.02 X10(3)/MCL (ref 0–0.04)
IMM GRANULOCYTES NFR BLD AUTO: 0.3 %
IRON SATN MFR SERPL: 23 % (ref 20–50)
IRON SERPL-MCNC: 73 UG/DL (ref 50–170)
LYMPHOCYTES # BLD AUTO: 2.4 X10(3)/MCL (ref 0.6–4.6)
LYMPHOCYTES NFR BLD AUTO: 34.3 %
MAGNESIUM SERPL-MCNC: 2 MG/DL (ref 1.6–2.6)
MCH RBC QN AUTO: 30.2 PG (ref 27–31)
MCHC RBC AUTO-ENTMCNC: 31.8 G/DL (ref 33–36)
MCV RBC AUTO: 95 FL (ref 80–94)
MONOCYTES # BLD AUTO: 0.44 X10(3)/MCL (ref 0.1–1.3)
MONOCYTES NFR BLD AUTO: 6.3 %
NEUTROPHILS # BLD AUTO: 3.89 X10(3)/MCL (ref 2.1–9.2)
NEUTROPHILS NFR BLD AUTO: 55.6 %
NRBC BLD AUTO-RTO: 0 %
PHOSPHATE SERPL-MCNC: 2.3 MG/DL (ref 2.3–4.7)
PLATELET # BLD AUTO: 288 X10(3)/MCL (ref 130–400)
PMV BLD AUTO: 12.6 FL (ref 7.4–10.4)
POTASSIUM SERPL-SCNC: 3.7 MMOL/L (ref 3.5–5.1)
PROT SERPL-MCNC: 7.3 GM/DL (ref 6.4–8.3)
RBC # BLD AUTO: 4.43 X10(6)/MCL (ref 4.2–5.4)
SODIUM SERPL-SCNC: 141 MMOL/L (ref 136–145)
TIBC SERPL-MCNC: 239 UG/DL (ref 70–310)
TIBC SERPL-MCNC: 312 UG/DL (ref 250–450)
VIT B12 SERPL-MCNC: 673 PG/ML (ref 213–816)
WBC # SPEC AUTO: 7 X10(3)/MCL (ref 4.5–11.5)

## 2024-03-25 PROCEDURE — 82728 ASSAY OF FERRITIN: CPT

## 2024-03-25 PROCEDURE — 85025 COMPLETE CBC W/AUTO DIFF WBC: CPT

## 2024-03-25 PROCEDURE — 83540 ASSAY OF IRON: CPT

## 2024-03-25 PROCEDURE — 80053 COMPREHEN METABOLIC PANEL: CPT

## 2024-03-25 PROCEDURE — 83735 ASSAY OF MAGNESIUM: CPT

## 2024-03-25 PROCEDURE — 36415 COLL VENOUS BLD VENIPUNCTURE: CPT

## 2024-03-25 PROCEDURE — 84100 ASSAY OF PHOSPHORUS: CPT

## 2024-03-25 PROCEDURE — 82607 VITAMIN B-12: CPT

## 2024-03-25 PROCEDURE — 82746 ASSAY OF FOLIC ACID SERUM: CPT

## 2024-03-26 NOTE — PROGRESS NOTES
This is a telemedicine note.  Patient was treated using telemedicine, real-time audio according to [facility] protocols.  Antonia ARAUZ, distant provider, conducted the visit from the location identified below.  The patient participated in the visit at a non-[facility] location select about the patient (or patient's representative), identified below.  I am license in the state where the patient stated they were located.  The patient (or patient's representative) stated that they understood and accepted the privacy and security wrist to the information at their location.  Patient was located in [place] .  Antonia ARAUZ, trevon provider, was located in [facility].    [facility] Consult Note    Referring provider: No ref. provider found    Subjective:       Patient ID: Orin Carcamo is a 34 y.o. female.    Chief Complaint: No chief complaint on file.    Treatment Hx:  Venofer x 5 doses (12/22)  IV Dextran x 1 dose (1/2023)    Ms. Carcamo is a 34 y.o. year old AA female who was referred to our service by Dr. Sutherland  for anemia.     She has 2 kids and her cycles became heavier since the last child. She admits prior to September her menstrual cycle continued for 2 months straight. The bleeding was heavy with large clots and she would use 1 pack of pads per day. She was given a Depo shot 9/9/22 which stopped the bleeding, but she had another cycle the week of 10/10/22 which was light per the patient. She had been taking oral iron 1 pill daily which caused her stomach upset and chills. She complained of shortness of breath, cold intolerance, and severe fatigue. She ate 2 cups of ice a day.     Interval History:   She returns to the clinic today for a six month follow-up regarding her iron deficiency anemia.  She reports doing well today, but continues to have headache and fatigue. She is taking daily PRN meds for headaches. She continues to have an IUD in place, now with minimal vaginal bleeding. She denies any  heavy vaginal bleeding, blood in stool, N/V/D/C, abdominal pain, or PICA symptoms.     Past Medical History:   Diagnosis Date    Anemia, unspecified     HTN (hypertension)     Iron deficiency anemia due to chronic blood loss 10/18/2022      Review of patient's allergies indicates:  No Known Allergies   Current Outpatient Medications on File Prior to Visit   Medication Sig Dispense Refill    amLODIPine (NORVASC) 10 MG tablet Take 10 mg by mouth once daily.      folic acid (FOLVITE) 1 MG tablet Take 1 tablet (1 mg total) by mouth once daily. 30 tablet 11    ibuprofen (ADVIL,MOTRIN) 600 MG tablet Take 1 tablet (600 mg total) by mouth every 6 (six) hours as needed for Pain. 120 tablet 1    levonorgestreL (MIRENA) 21 mcg/24 hours (8 yrs) 52 mg IUD 1 each by Intrauterine route once.       Current Facility-Administered Medications on File Prior to Visit   Medication Dose Route Frequency Provider Last Rate Last Admin    0.9%  NaCl infusion   Intravenous Continuous Jd Edgar MD        alteplase injection 2 mg  2 mg Intra-Catheter PRN Susan Farley MD        dextrose 5 % and 0.45 % NaCl infusion   Intravenous Continuous Jd Edgar MD        heparin, porcine (PF) 100 unit/mL injection flush 500 Units  500 Units Intravenous PRSusan Oconnell MD        lactated ringers infusion   Intravenous Continuous Jd Edgar  mL/hr at 01/24/23 1032 New Bag at 01/24/23 1032    sodium chloride 0.9% 250 mL flush bag   Intravenous 1 time in Clinic/HOD Susan Farley MD        sodium chloride 0.9% flush 10 mL  10 mL Intravenous PRSusan Oconnell MD          Social History     Socioeconomic History    Marital status: Single   Tobacco Use    Smoking status: Never    Smokeless tobacco: Never   Substance and Sexual Activity    Alcohol use: Yes     Comment: OCCASIONAL    Drug use: Never    Sexual activity: Yes     Partners: Male     Birth control/protection: I.U.D.     Family History   Problem Relation Age of Onset    Diabetes  Mother          Review of Systems  Review of Systems   Constitutional:  Negative for appetite change, chills, fatigue, fever and unexpected weight change.   HENT:  Negative for facial swelling, mouth sores, nosebleeds, sinus pressure/congestion and sore throat.    Eyes:  Negative for photophobia, pain and visual disturbance.   Respiratory:  Negative for cough, chest tightness, shortness of breath and wheezing.    Cardiovascular:  Negative for chest pain, palpitations and leg swelling.   Gastrointestinal:  Negative for abdominal pain, blood in stool, change in bowel habit, constipation, diarrhea, nausea and vomiting.   Endocrine: Negative.    Genitourinary:  Negative for dysuria, frequency, hematuria, hot flashes, pelvic pain, urgency and vaginal pain.   Musculoskeletal:  Positive for back pain. Negative for arthralgias, gait problem, joint swelling and myalgias.   Integumentary:  Negative for pallor, rash, wound, breast mass, breast discharge and breast tenderness.   Allergic/Immunologic: Negative.  Negative for immunocompromised state.   Neurological:  Positive for headaches. Negative for dizziness, vertigo, syncope, weakness and numbness.   Hematological:  Negative for adenopathy. Does not bruise/bleed easily.   Psychiatric/Behavioral:  Negative for behavioral problems and dysphoric mood. The patient is not nervous/anxious.    All other systems reviewed and are negative.  Breast: Negative for mass and tenderness       Physical Exam  Physical Exam  Vitals reviewed.   Constitutional:       General: She is not in acute distress.     Appearance: Normal appearance. She is normal weight.   HENT:      Head: Normocephalic and atraumatic.      Nose: Nose normal. No congestion or rhinorrhea.      Mouth/Throat:      Mouth: Mucous membranes are moist.      Pharynx: Oropharynx is clear. No oropharyngeal exudate or posterior oropharyngeal erythema.   Eyes:      Extraocular Movements: Extraocular movements intact.       Conjunctiva/sclera: Conjunctivae normal.      Pupils: Pupils are equal, round, and reactive to light.   Cardiovascular:      Rate and Rhythm: Normal rate and regular rhythm.      Pulses: Normal pulses.      Heart sounds: Normal heart sounds. No murmur heard.     No friction rub. No gallop.   Pulmonary:      Effort: Pulmonary effort is normal. No respiratory distress.      Breath sounds: Normal breath sounds. No wheezing, rhonchi or rales.   Abdominal:      General: Abdomen is flat. Bowel sounds are normal.      Palpations: Abdomen is soft. There is no mass.      Tenderness: There is no abdominal tenderness. There is no guarding.   Musculoskeletal:         General: No swelling, tenderness or signs of injury. Normal range of motion.      Cervical back: Normal range of motion and neck supple.      Right lower leg: No edema.      Left lower leg: No edema.   Lymphadenopathy:      Cervical: No cervical adenopathy.   Skin:     General: Skin is warm and dry.      Findings: No erythema, lesion or rash.   Neurological:      General: No focal deficit present.      Mental Status: She is alert and oriented to person, place, and time. Mental status is at baseline.      Cranial Nerves: No cranial nerve deficit.      Motor: No weakness.      Gait: Gait normal.   Psychiatric:         Mood and Affect: Mood normal.         Behavior: Behavior normal.         Thought Content: Thought content normal.         Judgment: Judgment normal.            Lab Visit on 03/25/2024   Component Date Value    Sodium Level 03/25/2024 141     Potassium Level 03/25/2024 3.7     Chloride 03/25/2024 106     Carbon Dioxide 03/25/2024 27     Glucose Level 03/25/2024 115 (H)     Blood Urea Nitrogen 03/25/2024 13.0     Creatinine 03/25/2024 0.83     Calcium Level Total 03/25/2024 8.7     Protein Total 03/25/2024 7.3     Albumin Level 03/25/2024 3.5     Globulin 03/25/2024 3.8 (H)     Albumin/Globulin Ratio 03/25/2024 0.9 (L)     Bilirubin Total 03/25/2024 1.0      Alkaline Phosphatase 03/25/2024 89     Alanine Aminotransferase 03/25/2024 10     Aspartate Aminotransfera* 03/25/2024 13     eGFR 03/25/2024 >60     Magnesium Level 03/25/2024 2.00     Phosphorus Level 03/25/2024 2.3     Iron Binding Capacity Un* 03/25/2024 239     Iron Level 03/25/2024 73     Iron Binding Capacity To* 03/25/2024 312     Iron Saturation 03/25/2024 23     Ferritin Level 03/25/2024 65.64     Folate Level 03/25/2024 7.6     Vitamin B12 Level 03/25/2024 673     WBC 03/25/2024 7.00     RBC 03/25/2024 4.43     Hgb 03/25/2024 13.4     Hct 03/25/2024 42.1     MCV 03/25/2024 95.0 (H)     MCH 03/25/2024 30.2     MCHC 03/25/2024 31.8 (L)     RDW 03/25/2024 12.4     Platelet 03/25/2024 288     MPV 03/25/2024 12.6 (H)     Neut % 03/25/2024 55.6     Lymph % 03/25/2024 34.3     Mono % 03/25/2024 6.3     Eos % 03/25/2024 2.4     Basophil % 03/25/2024 1.1     Lymph # 03/25/2024 2.40     Neut # 03/25/2024 3.89     Mono # 03/25/2024 0.44     Eos # 03/25/2024 0.17     Baso # 03/25/2024 0.08     IG# 03/25/2024 0.02     IG% 03/25/2024 0.3     NRBC% 03/25/2024 0.0         US Pelvis Complete Non OB  STUDY: Ultrasound pelvis  (transabdominal and transvaginal)         cap1750 10:34 AM 10/18/2022:    OP    CLINICAL HX; MENORRHAGIA FOR 3-4 MONTHS    PMH:NONE    TECH;2D TRANSABD AND TRANSVAG US OF PELVIS WITH COLOR FLOW    US TECH;PLP                COMPARISON: none            FINDINGS:         UTERUS: Uterus is mildly to moderately enlarged measuring 11.3 x 6.2 x 5.7 cm.  The myometrium has a mildly to moderately heterogeneous echogenic appearance with no worrisome focal masses appreciated.  Endometrial stripe is thickened measuring 1.6 cm in   AP thickness on transvaginal imaging.        OVARIES: The right ovary measures 3.4 x 2.0 x 2.2 cm and the left ovary measures 3.7 x 2.2 x 2.5 cm with benign-appearing follicles and small (less than 1.5 cm in greatest diameter) simple cysts noted originating from both ovaries.  No  worrisome adnexal   abnormalities are appreciated.        MISCELLANEOUS: A small amount of free fluid is noted within the cul-de-sac.  There was no significant tenderness while scanning over the pelvis.            IMPRESSION:        1.  The uterus is mildly to moderately enlarged with the myometrium having a heterogeneous echogenic appearance and thickening of the endometrial stripe which measures 1.6 cm in AP thickness on transvaginal imaging.        2.  Benign-appearing follicles and small (less then 1.5 cm in greatest diameter), benign-appearing, simple cysts are noted originating from both ovaries.        3.  A small amount of free fluid is noted within the cul-de-sac with no significant tenderness while scanning over the pelvis.        Assessment:     GABRIELA:  Hgb improved from 6.0-->8.6 s/p 2u PRBC   Treated with IV Venofer x 5 doses (11/2022)  Treated with Iron Dextran x 1 dose 1/25/23  discontinued PO iron due to her poor tolerance  CBC and iron studies are normal. Has IUD with minimal bleeding now  Folate deficiency  Continue folic acid daily.   Headaches  Now with eyeglasses and still getting headaches. Taking PRN meds daily, recommended follow-up with PCP.   Fatigue  Labs stable  Recommend follow-up with PCP for sleep study.     Plan:   Labs stable.  Normal iron studies.  Continue folic acid   Follow-up with PCP for headaches and fatigue  RTC in 6 months with NP for FU/lab.       GISSEL Sawyer-C  Oncology/Hematology   [facility]

## 2024-03-27 ENCOUNTER — OFFICE VISIT (OUTPATIENT)
Dept: HEMATOLOGY/ONCOLOGY | Facility: CLINIC | Age: 34
End: 2024-03-27
Payer: COMMERCIAL

## 2024-03-27 DIAGNOSIS — R51.9 CHRONIC NONINTRACTABLE HEADACHE, UNSPECIFIED HEADACHE TYPE: ICD-10-CM

## 2024-03-27 DIAGNOSIS — G89.29 CHRONIC NONINTRACTABLE HEADACHE, UNSPECIFIED HEADACHE TYPE: ICD-10-CM

## 2024-03-27 DIAGNOSIS — D50.0 IRON DEFICIENCY ANEMIA DUE TO CHRONIC BLOOD LOSS: Primary | ICD-10-CM

## 2024-03-27 DIAGNOSIS — D50.0 ANEMIA DUE TO CHRONIC BLOOD LOSS: ICD-10-CM

## 2024-03-27 DIAGNOSIS — R53.83 FATIGUE, UNSPECIFIED TYPE: ICD-10-CM

## 2024-03-27 DIAGNOSIS — E53.8 FOLATE DEFICIENCY: ICD-10-CM

## 2024-03-27 PROCEDURE — 99215 OFFICE O/P EST HI 40 MIN: CPT | Mod: 95,,,

## 2024-03-27 RX ORDER — ERGOCALCIFEROL 1.25 1/1
50000 CAPSULE ORAL
COMMUNITY
Start: 2024-02-24

## 2024-04-05 ENCOUNTER — HOSPITAL ENCOUNTER (EMERGENCY)
Facility: HOSPITAL | Age: 34
Discharge: HOME | End: 2024-04-05
Attending: GENERAL ACUTE CARE HOSPITAL
Payer: COMMERCIAL

## 2024-04-05 VITALS
BODY MASS INDEX: 39.68 KG/M2 | OXYGEN SATURATION: 97 % | SYSTOLIC BLOOD PRESSURE: 144 MMHG | WEIGHT: 293 LBS | TEMPERATURE: 98 F | RESPIRATION RATE: 18 BRPM | HEART RATE: 89 BPM | HEIGHT: 72 IN | DIASTOLIC BLOOD PRESSURE: 95 MMHG
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
## 2024-04-05 PROCEDURE — 90471 IMMUNIZATION ADMIN: CPT | Performed by: NURSE PRACTITIONER

## 2024-04-05 PROCEDURE — 90715 TDAP VACCINE 7 YRS/> IM: CPT | Performed by: NURSE PRACTITIONER

## 2024-04-05 PROCEDURE — 63600175 PHARM REV CODE 636 W HCPCS: Performed by: NURSE PRACTITIONER

## 2024-04-05 PROCEDURE — 99284 EMERGENCY DEPT VISIT MOD MDM: CPT | Mod: 25

## 2024-04-05 RX ORDER — AMOXICILLIN AND CLAVULANATE POTASSIUM 875; 125 MG/1; MG/1
1 TABLET, FILM COATED ORAL 2 TIMES DAILY
Qty: 14 TABLET | Refills: 0 | Status: SHIPPED | OUTPATIENT
Start: 2024-04-05

## 2024-04-05 RX ADMIN — TETANUS TOXOID, REDUCED DIPHTHERIA TOXOID AND ACELLULAR PERTUSSIS VACCINE, ADSORBED 0.5 ML: 5; 2.5; 8; 8; 2.5 SUSPENSION INTRAMUSCULAR at 02:04

## 2024-04-05 NOTE — ED PROVIDER NOTES
Encounter Date: 2024       History     Chief Complaint   Patient presents with    Animal Bite     Dog bite to R wrist /forearm     See MDM    The history is provided by the patient. No  was used.     Review of patient's allergies indicates:  No Known Allergies  Past Medical History:   Diagnosis Date    Anemia, unspecified     HTN (hypertension)     Iron deficiency anemia due to chronic blood loss 10/18/2022     Past Surgical History:   Procedure Laterality Date     SECTION N/A     HYSTEROSCOPY WITH DILATION AND CURETTAGE OF UTERUS N/A 2023    Procedure: HYSTEROSCOPY, WITH DILATION AND CURETTAGE OF UTERUS;  Surgeon: Jd Edgar;  Location: [facility] OR;  Service: OB/GYN;  Laterality: N/A;    INTRAUTERINE DEVICE INSERTION N/A 2023    Procedure: INSERTION, INTRAUTERINE DEVICE;  Surgeon: Jd Edgar;  Location: [facility] OR;  Service: OB/GYN;  Laterality: N/A;     Family History   Problem Relation Age of Onset    Diabetes Mother      Social History     Tobacco Use    Smoking status: Never    Smokeless tobacco: Never   Substance Use Topics    Alcohol use: Yes     Comment: OCCASIONAL    Drug use: Never     Review of Systems   Constitutional:  Negative for fever.   Respiratory:  Negative for cough and shortness of breath.    Cardiovascular:  Negative for chest pain.   Gastrointestinal:  Negative for abdominal pain.   Genitourinary:  Negative for difficulty urinating and dysuria.   Musculoskeletal:  Negative for gait problem.   Skin:  Negative for color change.   Neurological:  Negative for dizziness, speech difficulty and headaches.   Psychiatric/Behavioral:  Negative for hallucinations and suicidal ideas.    All other systems reviewed and are negative.      Physical Exam     Initial Vitals [24 1438]   BP Pulse Resp Temp SpO2   (!) 154/112 92 16 97.8 °F (36.6 °C) 98 %      MAP       --         Physical Exam    Nursing note and vitals reviewed.  Constitutional: She appears  well-developed and well-nourished.   HENT:   Head: Normocephalic.   Eyes: EOM are normal.   Neck:   Normal range of motion.  Cardiovascular:  Normal rate, regular rhythm, normal heart sounds and intact distal pulses.           Pulmonary/Chest: Breath sounds normal. No respiratory distress.   Abdominal: Abdomen is soft. Bowel sounds are normal. There is no abdominal tenderness.   Musculoskeletal:         General: Normal range of motion.      Cervical back: Normal range of motion.     Neurological: She is alert and oriented to person, place, and time. She has normal strength.   Skin: Skin is warm and dry.   Dog bite to right forearm   Psychiatric: She has a normal mood and affect. Her behavior is normal. Judgment and thought content normal.         ED Course   Procedures  Labs Reviewed - No data to display       Imaging Results    None          Medications   Tdap (BOOSTRIX) vaccine injection 0.5 mL (0.5 mLs Intramuscular Given 4/5/24 7909)     Medical Decision Making  Historian:  Patient.  Patient is a 34-year-old female  that presents with dog bite to right forearm that has been present today. Associated symptoms nothing. Surrounding information is was the neighbor's dog and is in possession of animal control. Exacerbated by nothing. Relieved by nothing. Patient treatment prior to arrival none. Risk factors include none. Other history pertaining to this complaint nothing.   Assessment:  See physical exam.  DD:  Dog bite  ED Course: History was obtained.  Physical was performed.  The dog is in captivity with animal control.  They can observe the dog.  The dog has not been ill.  Do not believe that patient needs rabies vaccine. Discussed with Dr Maya. Medical or surgical consults:  None. Social determinants that affect healthcare:  None.       Risk  Prescription drug management.                                      Clinical Impression:  Final diagnoses:  [W54.0XXA] Dog bite, initial encounter - right forearm  (Primary)          ED Disposition Condition    Discharge Stable          ED Prescriptions       Medication Sig Dispense Start Date End Date Auth. Provider    amoxicillin-clavulanate 875-125mg (AUGMENTIN) 875-125 mg per tablet Take 1 tablet by mouth 2 (two) times daily. 14 tablet 4/5/2024 -- Rj Bermudez FNP          Follow-up Information       Follow up With Specialties Details Why Contact Info    Your Primary Care Provider  Call in 3 days ed follow up              Rj Bermudez FNP  04/05/24 1500

## 2024-11-19 ENCOUNTER — PATIENT MESSAGE (OUTPATIENT)
Dept: OBSTETRICS AND GYNECOLOGY | Facility: CLINIC | Age: 34
End: 2024-11-19

## 2024-11-19 ENCOUNTER — OFFICE VISIT (OUTPATIENT)
Dept: OBSTETRICS AND GYNECOLOGY | Facility: CLINIC | Age: 34
End: 2024-11-19
Payer: COMMERCIAL

## 2024-11-19 VITALS
WEIGHT: 293 LBS | BODY MASS INDEX: 39.68 KG/M2 | HEIGHT: 72 IN | SYSTOLIC BLOOD PRESSURE: 126 MMHG | DIASTOLIC BLOOD PRESSURE: 72 MMHG

## 2024-11-19 DIAGNOSIS — T83.32XA DISPLACEMENT OF INTRAUTERINE CONTRACEPTIVE DEVICE, INITIAL ENCOUNTER: Primary | ICD-10-CM

## 2024-11-19 LAB
B-HCG UR QL: NEGATIVE
CTP QC/QA: YES

## 2024-11-19 PROCEDURE — 81025 URINE PREGNANCY TEST: CPT | Mod: ,,,

## 2024-11-19 PROCEDURE — 99213 OFFICE O/P EST LOW 20 MIN: CPT | Mod: ,,,

## 2024-11-19 NOTE — PROGRESS NOTES
Chief Complaint:  Contraception (Pt states IUD fell out during intercourse last week & desires another IUD, NEGATIVE upt in clinic today)    History of Present Illness:  Orin is a 34 y.o.  who presents today to order another Mirena.  Patient states Mirena IUD fell out during intercourse and she desires another.  Denies any complaints.  UPT negative.  Patient states IUD is at home, she does not have a picture of proof.    Inserted 23 per Dr. Edgar.    Review of Systems:  General/Constitutional: Chills denies. Fatigue/weakness denies. Fever denies. Night sweats denies. Hot flashes denies  Gastrointestinal: Abdominal pain denies. Blood in stool denies. Constipation denies. Diarrhea denies. Heartburn denies. Nausea denies. Vomiting denies   Genitourinary: Incontinence denies. Blood in urine denies. Frequent urination denies. Urgency denies. Painful urination denies. Nocturia denies   Gynecologic: Irregular menses denies. Heavy bleeding  denies. Painful menses denies. Vaginal discharge denies.Vaginal odor denies. Vaginal itching/Irritation denies. Vaginal lesion denies.  Pelvic pain denies. Decreased libido denies. Vulvar lesion denies. Prolapse of genital organs denies. Painful intercourse denies. Postcoital bleeding denies   Psychiatric: Mood lability denies. Depressed mood denies. Suicidal thoughts denies. Anxiety denies. Overwhelmed denies. Appetite normal. Energy level normal     OB History    Para Term  AB Living   0 0 0 0 0 0   SAB IAB Ectopic Multiple Live Births   0 0 0 0 0      The patient has never been pregnant.    Past Medical History:   Diagnosis Date    Anemia, unspecified     HTN (hypertension)     Iron deficiency anemia due to chronic blood loss 10/18/2022       Current Outpatient Medications:     amLODIPine (NORVASC) 10 MG tablet, Take 10 mg by mouth once daily., Disp: , Rfl:     ibuprofen (ADVIL,MOTRIN) 600 MG tablet, Take 1 tablet (600 mg total) by mouth every 6 (six)  "hours as needed for Pain., Disp: 120 tablet, Rfl: 1    levonorgestreL (MIRENA) 21 mcg/24 hours (8 yrs) 52 mg IUD, 1 each by Intrauterine route once., Disp: , Rfl:     VITAMIN D2 1,250 mcg (50,000 unit) capsule, Take 50,000 Units by mouth every 7 days., Disp: , Rfl:     folic acid (FOLVITE) 1 MG tablet, Take 1 tablet (1 mg total) by mouth once daily., Disp: 30 tablet, Rfl: 11    Current Facility-Administered Medications:     heparin, porcine (PF) 100 unit/mL injection flush 500 Units, 500 Units, Intravenous, PRN, Susan Farley MD    sodium chloride 0.9% 250 mL flush bag, , Intravenous, 1 time in Clinic/HOD, Susan Farley MD    sodium chloride 0.9% flush 10 mL, 10 mL, Intravenous, PRN, Susan Farley MD    Facility-Administered Medications Ordered in Other Visits:     0.9%  NaCl infusion, , Intravenous, Continuous, Jd Edgar MD    dextrose 5 % and 0.45 % NaCl infusion, , Intravenous, Continuous, Jd Edgar MD    lactated ringers infusion, , Intravenous, Continuous, Jd Edgar MD, Last Rate: 100 mL/hr at 01/24/23 1032, New Bag at 01/24/23 1032    Review of patient's allergies indicates:  No Known Allergies  Social History     Socioeconomic History    Marital status: Single   Tobacco Use    Smoking status: Never    Smokeless tobacco: Never   Substance and Sexual Activity    Alcohol use: Yes     Comment: OCCASIONAL    Drug use: Never    Sexual activity: Yes     Partners: Male     Birth control/protection: I.U.D.         Physical Exam:  /72 (BP Location: Right arm, Patient Position: Sitting)   Ht 6' 1" (1.854 m)   Wt (!) 142.9 kg (315 lb)   BMI 41.56 kg/m²       Constitutional: General appearance: healthy, well-nourished and well-developed   Psychiatric:  Orientation to time, place and person. Normal mood and affect and active, alert   PELVIC: Normal external genitalia without lesions.  Normal hair distribution.  Adequate perineal body, normal urethral meatus.  Vagina moist and well rugated without " lesions or discharge.  Cervix pink, without lesions, discharge or tenderness. IUD strings NOT visible at the cervical os.      Assessment/Plan:  1. Displacement of intrauterine contraceptive device, initial encounter  -     POCT Urine Pregnancy  -     US Pelvis Comp with Transvag NON-OB (xpd; Future; Expected date: 11/26/2024       Will do US to confirm and pt will also send picture of device prior to ordering a new one.    Pt to RTC for Mirena insertion per Dr. Edgar.

## 2024-11-26 ENCOUNTER — HOSPITAL ENCOUNTER (OUTPATIENT)
Dept: RADIOLOGY | Facility: HOSPITAL | Age: 34
Discharge: HOME | End: 2024-11-26
Payer: COMMERCIAL

## 2024-11-26 DIAGNOSIS — T83.32XA DISPLACEMENT OF INTRAUTERINE CONTRACEPTIVE DEVICE, INITIAL ENCOUNTER: ICD-10-CM

## 2024-11-26 PROCEDURE — 76830 TRANSVAGINAL US NON-OB: CPT | Mod: TC

## 2024-12-03 ENCOUNTER — TELEPHONE (OUTPATIENT)
Dept: OBSTETRICS AND GYNECOLOGY | Facility: CLINIC | Age: 34
End: 2024-12-03
Payer: COMMERCIAL

## 2024-12-03 NOTE — TELEPHONE ENCOUNTER
Spoke with pt and informed her that Eliana is gone for the day and that we will talked to her about her ultrasound results and pt wants to know if they will do the Mirena insertion in office and also if she will have a copay for the Mirena. Pt verbalized understanding

## 2024-12-03 NOTE — TELEPHONE ENCOUNTER
----- Message from Maddi sent at 12/3/2024  4:21 PM CST -----  Regarding: US results  .Type:  Test Results    Who Called:  patient  Name of Test (Lab/Mammo/Etc):  Ultrasound results done on 11/26  Best Call Back Number:  [phone]  Additional Information:   called in regards to results and when can she be scheduled for Mirena insertion

## 2024-12-04 ENCOUNTER — TELEPHONE (OUTPATIENT)
Dept: OBSTETRICS AND GYNECOLOGY | Facility: CLINIC | Age: 34
End: 2024-12-04
Payer: COMMERCIAL

## 2024-12-06 NOTE — PROGRESS NOTES
Chief Complaint:  IUD insertion    History of Present Illness:   Orin Carcamo is a 34 y.o.  who presents today for Mirena IUD placement.       Gyn History:    Menstrual History  Cycle: No  Menarche Age: 1 years  No Cycle Reason: Other    Menopause  Menopause Age: 1 years  Post Menopausal Bleeding: No  Hormone Replacement Therapy: No    Pap History  Last pap date: 10/13/22  Result: Normal  History of Abnormal Pap: No  HPV Vaccine Completed: No    [place]  Sexually Active: Yes  Sexual Orientation: heterosexual  Postcoital Bleeding: No  Dyspareunia: No  STI History: No  Contraception: No    Breast History  Last Breast Imaging Date: No  History of Breast Biopsy: No         Review of Systems:  General/Constitutional: Chills denies. Fatigue/weakness denies. Fever denies . Night sweats denies . Hot flashes denies  Gynecologic: Irregular menses denies.  Heavy bleeding  denies.  Painful menses denies.  Vaginal discharge denies. Vaginal odor denies. Vaginal itching/Irritation denies. Vaginal lesion denies.  Pelvic pain denies. Decreased libido denies. Vulvar lesion denies. Prolapse of genital organs denies. Painful intercourse denies. Postcoital bleeding denies   Psychiatric: Mood lability denies.  Depressed mood denies. Suicidal thoughts denies. Anxiety denies.  Overwhelmed denies.  Appetite normal. Energy level normal      OB History    Para Term  AB Living   0 0 0 0 0 0   SAB IAB Ectopic Multiple Live Births   0 0 0 0 0      The patient has never been pregnant.      Current Outpatient Medications:     amLODIPine (NORVASC) 10 MG tablet, Take 10 mg by mouth once daily., Disp: , Rfl:     folic acid (FOLVITE) 1 MG tablet, Take 1 tablet (1 mg total) by mouth once daily., Disp: 30 tablet, Rfl: 11    ibuprofen (ADVIL,MOTRIN) 600 MG tablet, Take 1 tablet (600 mg total) by mouth every 6 (six) hours as needed for Pain., Disp: 120 tablet, Rfl: 1    levonorgestreL (MIRENA) 21 mcg/24 hours (8 yrs) 52 mg IUD,  1 each by Intrauterine route once., Disp: , Rfl:     VITAMIN D2 1,250 mcg (50,000 unit) capsule, Take 50,000 Units by mouth every 7 days., Disp: , Rfl:     Current Facility-Administered Medications:     heparin, porcine (PF) 100 unit/mL injection flush 500 Units, 500 Units, Intravenous, PRN, Susan Farley MD    sodium chloride 0.9% 250 mL flush bag, , Intravenous, 1 time in Clinic/HOD, Susan Farley MD    sodium chloride 0.9% flush 10 mL, 10 mL, Intravenous, PRN, Susan Farley MD    Facility-Administered Medications Ordered in Other Visits:     0.9%  NaCl infusion, , Intravenous, Continuous, Jd Edgar MD    dextrose 5 % and 0.45 % NaCl infusion, , Intravenous, Continuous, Jd Edgar MD    lactated ringers infusion, , Intravenous, Continuous, Jd Edgar MD, Last Rate: 100 mL/hr at 01/24/23 1032, New Bag at 01/24/23 1032    Physical Exam:  /74   Pulse (P) 84   Resp (P) 18   Wt (!) 141.1 kg (311 lb)   BMI 41.03 kg/m²     Chaperone present.    Constitutional: General appearance: healthy, well-nourished and well-developed  Psychiatric: Orientation to time, place and person. Normal mood and affect and active, alert  Abdomen: Auscultation/Inspection/Palpation: deferred  Female Genitalia:  Vulva: no masses, atrophy or lesions  Bladder/Urethra: no urethral discharge or mass, normal meatus, bladder   non-distended.  Vagina: no tenderness, erythema, cystocele, rectocele, abnormal  vaginal discharge, or vesicle(s) or ulcers   Cervix: no discharge or cervical motion tenderness and grossly normal  Uterus: normal size and shape and midline, non-tender, and no uterine   prolapse.  Adnexa/Parametria: no parametrial tenderness or mass, no adnexal tenderness  or ovarian mass.    Procedure:   After having reviewed the contraindications, side effects, and risks and benefits of all major options for reversible contraception, the patient chose the Mirena IUD for contraception. We discussed the risks of insertion,  pelvic infection, and the possibility of failure. Patient stated that she has a good understanding of all we discussed, and all questions were answered regarding IUD use. Patient signed consent form.   Pelvic examination was normal. Pap smear is current. Urine pregnancy test negative.  With the patient in the dorsal lithotomy position, a speculum was placed in the vagina. The cervix and vagina were prepped with Betadine, the cervix was stabilized with a tenaculum The Mirena IUD was then placed in the endometrial cavity as directed without complications. The strings were trimmed to approximately to 2 cm.  Patient tolerated procedure well.      Assessment/Plan:  1. Encounter for IUD insertion  -     POCT urine pregnancy  -     levonorgestreL (Mirena) 52 mg IUD 1 Intra Uterine Device    2. Iron deficiency anemia due to chronic blood loss  -     levonorgestreL (Mirena) 52 mg IUD 1 Intra Uterine Device         UPT negative, Consent given, IUD inserted, Tolerated well  Patient educated on IUD & bleeding patterns.  Backup Contraception until next visit  String check 4 weeks    Mirena IUD:  NDC: [id number]  LOT: [id number]  EXP: 1/30/2027    RTC 4 weeks      This note was transcribed by Geetha Mills. There may be transcription errors as a result, however minimal. Effort has been made to ensure accuracy of transcription, but any obvious errors or omissions should be clarified with the author of the document.       I agree with the above documentation.

## 2024-12-10 ENCOUNTER — PROCEDURE VISIT (OUTPATIENT)
Dept: OBSTETRICS AND GYNECOLOGY | Facility: CLINIC | Age: 34
End: 2024-12-10
Payer: COMMERCIAL

## 2024-12-10 VITALS
BODY MASS INDEX: 41.03 KG/M2 | WEIGHT: 293 LBS | RESPIRATION RATE: 16 BRPM | HEART RATE: 78 BPM | SYSTOLIC BLOOD PRESSURE: 126 MMHG | DIASTOLIC BLOOD PRESSURE: 74 MMHG

## 2024-12-10 DIAGNOSIS — D50.0 IRON DEFICIENCY ANEMIA DUE TO CHRONIC BLOOD LOSS: ICD-10-CM

## 2024-12-10 DIAGNOSIS — Z30.430 ENCOUNTER FOR IUD INSERTION: Primary | ICD-10-CM

## 2024-12-10 LAB
B-HCG UR QL: NEGATIVE
CTP QC/QA: YES

## 2024-12-10 PROCEDURE — 99499 UNLISTED E&M SERVICE: CPT | Mod: ,,, | Performed by: OBSTETRICS & GYNECOLOGY

## 2024-12-10 PROCEDURE — 58300 INSERT INTRAUTERINE DEVICE: CPT | Mod: ,,, | Performed by: OBSTETRICS & GYNECOLOGY

## 2024-12-10 PROCEDURE — 81025 URINE PREGNANCY TEST: CPT | Mod: ,,, | Performed by: OBSTETRICS & GYNECOLOGY

## (undated) DEVICE — TUBE AQUILEX VACUUM SET HI LO

## (undated) DEVICE — DRAPE LEGGINGS CUFF 31X48IN

## (undated) DEVICE — SOL NACL IRR 1000ML BTL

## (undated) DEVICE — TRAY DRY SKIN SCRUB PREP

## (undated) DEVICE — SEAL LENS SCOPE MYOSURE

## (undated) DEVICE — TUBING SUCTION CONNECTING 10FT

## (undated) DEVICE — DRESSING TELFA N ADH 3X8IN

## (undated) DEVICE — SET EXT MACROBORE 15IN

## (undated) DEVICE — GLOVE PROTEXIS PI SYN SURG 8.5

## (undated) DEVICE — DRAPE UNDER BUTTOCKS SUC PORT

## (undated) DEVICE — TRAY CATH URETHRAL FOLEY 16FR

## (undated) DEVICE — JELLY KY LUBRICATING 5G PACKET

## (undated) DEVICE — GOWN POLY REINF BRTH SLV 2XL

## (undated) DEVICE — KIT MAJOR SINGLE BASIN

## (undated) DEVICE — PACK BASIC